# Patient Record
Sex: FEMALE | Race: WHITE | URBAN - METROPOLITAN AREA
[De-identification: names, ages, dates, MRNs, and addresses within clinical notes are randomized per-mention and may not be internally consistent; named-entity substitution may affect disease eponyms.]

---

## 2018-06-14 ENCOUNTER — EMERGENCY (EMERGENCY)
Facility: HOSPITAL | Age: 73
LOS: 1 days | End: 2018-06-14
Payer: MEDICARE

## 2018-06-14 ENCOUNTER — INPATIENT (INPATIENT)
Facility: HOSPITAL | Age: 73
LOS: 6 days | Discharge: HOSPICE | DRG: 86 | End: 2018-06-21
Attending: SURGERY | Admitting: SURGERY
Payer: MEDICARE

## 2018-06-14 VITALS — HEART RATE: 74 BPM | OXYGEN SATURATION: 100 %

## 2018-06-14 DIAGNOSIS — Z95.5 PRESENCE OF CORONARY ANGIOPLASTY IMPLANT AND GRAFT: Chronic | ICD-10-CM

## 2018-06-14 DIAGNOSIS — I62.9 NONTRAUMATIC INTRACRANIAL HEMORRHAGE, UNSPECIFIED: ICD-10-CM

## 2018-06-14 LAB
ABO RH CONFIRMATION: SIGNIFICANT CHANGE UP
ALBUMIN SERPL ELPH-MCNC: 3.7 G/DL — SIGNIFICANT CHANGE UP (ref 3.3–5.2)
ALP SERPL-CCNC: 59 U/L — SIGNIFICANT CHANGE UP (ref 40–120)
ALT FLD-CCNC: 13 U/L — SIGNIFICANT CHANGE UP
ANION GAP SERPL CALC-SCNC: 10 MMOL/L — SIGNIFICANT CHANGE UP (ref 5–17)
ANION GAP SERPL CALC-SCNC: 12 MMOL/L — SIGNIFICANT CHANGE UP (ref 5–17)
ANION GAP SERPL CALC-SCNC: 15 MMOL/L — SIGNIFICANT CHANGE UP (ref 5–17)
APPEARANCE UR: CLEAR — SIGNIFICANT CHANGE UP
APTT BLD: 31.1 SEC — SIGNIFICANT CHANGE UP (ref 27.5–37.4)
AST SERPL-CCNC: 24 U/L — SIGNIFICANT CHANGE UP
BASE EXCESS BLDA CALC-SCNC: 3.5 MMOL/L — HIGH (ref -2–2)
BASOPHILS # BLD AUTO: 0 K/UL — SIGNIFICANT CHANGE UP (ref 0–0.2)
BASOPHILS NFR BLD AUTO: 0.2 % — SIGNIFICANT CHANGE UP (ref 0–2)
BILIRUB SERPL-MCNC: 0.6 MG/DL — SIGNIFICANT CHANGE UP (ref 0.4–2)
BILIRUB UR-MCNC: NEGATIVE — SIGNIFICANT CHANGE UP
BLD GP AB SCN SERPL QL: SIGNIFICANT CHANGE UP
BLOOD GAS COMMENTS ARTERIAL: SIGNIFICANT CHANGE UP
BUN SERPL-MCNC: 18 MG/DL — SIGNIFICANT CHANGE UP (ref 8–20)
BUN SERPL-MCNC: 5 MG/DL — LOW (ref 8–20)
BUN SERPL-MCNC: 7 MG/DL — LOW (ref 8–20)
CALCIUM SERPL-MCNC: 5.3 MG/DL — CRITICAL LOW (ref 8.6–10.2)
CALCIUM SERPL-MCNC: 8.7 MG/DL — SIGNIFICANT CHANGE UP (ref 8.6–10.2)
CALCIUM SERPL-MCNC: 9.1 MG/DL — SIGNIFICANT CHANGE UP (ref 8.6–10.2)
CHLORIDE SERPL-SCNC: 103 MMOL/L — SIGNIFICANT CHANGE UP (ref 98–107)
CHLORIDE SERPL-SCNC: 114 MMOL/L — HIGH (ref 98–107)
CHLORIDE SERPL-SCNC: 96 MMOL/L — LOW (ref 98–107)
CO2 SERPL-SCNC: 16 MMOL/L — LOW (ref 22–29)
CO2 SERPL-SCNC: 23 MMOL/L — SIGNIFICANT CHANGE UP (ref 22–29)
CO2 SERPL-SCNC: 27 MMOL/L — SIGNIFICANT CHANGE UP (ref 22–29)
COLOR SPEC: YELLOW — SIGNIFICANT CHANGE UP
CREAT SERPL-MCNC: 0.39 MG/DL — LOW (ref 0.5–1.3)
CREAT SERPL-MCNC: 0.59 MG/DL — SIGNIFICANT CHANGE UP (ref 0.5–1.3)
CREAT SERPL-MCNC: <0.2 MG/DL — LOW (ref 0.5–1.3)
DIFF PNL FLD: ABNORMAL
EOSINOPHIL # BLD AUTO: 0.1 K/UL — SIGNIFICANT CHANGE UP (ref 0–0.5)
EOSINOPHIL NFR BLD AUTO: 0.5 % — SIGNIFICANT CHANGE UP (ref 0–6)
EPI CELLS # UR: SIGNIFICANT CHANGE UP
GAS PNL BLDA: SIGNIFICANT CHANGE UP
GLUCOSE BLDC GLUCOMTR-MCNC: 123 MG/DL — HIGH (ref 70–99)
GLUCOSE BLDC GLUCOMTR-MCNC: 141 MG/DL — HIGH (ref 70–99)
GLUCOSE BLDC GLUCOMTR-MCNC: 148 MG/DL — HIGH (ref 70–99)
GLUCOSE BLDC GLUCOMTR-MCNC: 153 MG/DL — HIGH (ref 70–99)
GLUCOSE SERPL-MCNC: 135 MG/DL — HIGH (ref 70–115)
GLUCOSE SERPL-MCNC: 150 MG/DL — HIGH (ref 70–115)
GLUCOSE SERPL-MCNC: 94 MG/DL — SIGNIFICANT CHANGE UP (ref 70–115)
GLUCOSE UR QL: NEGATIVE MG/DL — SIGNIFICANT CHANGE UP
HCO3 BLDA-SCNC: 28 MMOL/L — HIGH (ref 20–26)
HCT VFR BLD CALC: 36.7 % — LOW (ref 37–47)
HGB BLD-MCNC: 11.2 G/DL — LOW (ref 12–16)
HOROWITZ INDEX BLDA+IHG-RTO: 40 — SIGNIFICANT CHANGE UP
INR BLD: 1.49 RATIO — HIGH (ref 0.88–1.16)
INR BLD: 1.74 RATIO — HIGH (ref 0.88–1.16)
KETONES UR-MCNC: ABNORMAL
LACTATE BLDV-MCNC: 1.4 MMOL/L — SIGNIFICANT CHANGE UP (ref 0.5–2)
LEUKOCYTE ESTERASE UR-ACNC: NEGATIVE — SIGNIFICANT CHANGE UP
LYMPHOCYTES # BLD AUTO: 1.3 K/UL — SIGNIFICANT CHANGE UP (ref 1–4.8)
LYMPHOCYTES # BLD AUTO: 9.6 % — LOW (ref 20–55)
MAGNESIUM SERPL-MCNC: 1.5 MG/DL — LOW (ref 1.6–2.6)
MCHC RBC-ENTMCNC: 24.3 PG — LOW (ref 27–31)
MCHC RBC-ENTMCNC: 30.5 G/DL — LOW (ref 32–36)
MCV RBC AUTO: 79.8 FL — LOW (ref 81–99)
MONOCYTES # BLD AUTO: 1.2 K/UL — HIGH (ref 0–0.8)
MONOCYTES NFR BLD AUTO: 8.7 % — SIGNIFICANT CHANGE UP (ref 3–10)
NEUTROPHILS # BLD AUTO: 10.7 K/UL — HIGH (ref 1.8–8)
NEUTROPHILS NFR BLD AUTO: 80.6 % — HIGH (ref 37–73)
NITRITE UR-MCNC: NEGATIVE — SIGNIFICANT CHANGE UP
OSMOLALITY SERPL: 303 MOSM/KG — HIGH (ref 280–300)
OSMOLALITY UR: 482 MOSM/KG — SIGNIFICANT CHANGE UP (ref 300–1000)
PCO2 BLDA: 39 MMHG — SIGNIFICANT CHANGE UP (ref 35–45)
PH BLDA: 7.46 — HIGH (ref 7.35–7.45)
PH UR: 8 — SIGNIFICANT CHANGE UP (ref 5–8)
PLATELET # BLD AUTO: 255 K/UL — SIGNIFICANT CHANGE UP (ref 150–400)
PO2 BLDA: 92 MMHG — SIGNIFICANT CHANGE UP (ref 83–108)
POTASSIUM SERPL-MCNC: 2.1 MMOL/L — CRITICAL LOW (ref 3.5–5.3)
POTASSIUM SERPL-MCNC: 2.9 MMOL/L — CRITICAL LOW (ref 3.5–5.3)
POTASSIUM SERPL-MCNC: 3.5 MMOL/L — SIGNIFICANT CHANGE UP (ref 3.5–5.3)
POTASSIUM SERPL-SCNC: 2.1 MMOL/L — CRITICAL LOW (ref 3.5–5.3)
POTASSIUM SERPL-SCNC: 2.9 MMOL/L — CRITICAL LOW (ref 3.5–5.3)
POTASSIUM SERPL-SCNC: 3.5 MMOL/L — SIGNIFICANT CHANGE UP (ref 3.5–5.3)
PROT SERPL-MCNC: 6.8 G/DL — SIGNIFICANT CHANGE UP (ref 6.6–8.7)
PROT UR-MCNC: 15 MG/DL
PROTHROM AB SERPL-ACNC: 16.5 SEC — HIGH (ref 9.8–12.7)
PROTHROM AB SERPL-ACNC: 19.4 SEC — HIGH (ref 9.8–12.7)
RBC # BLD: 4.6 M/UL — SIGNIFICANT CHANGE UP (ref 4.4–5.2)
RBC # FLD: 18.3 % — HIGH (ref 11–15.6)
RBC CASTS # UR COMP ASSIST: ABNORMAL /HPF (ref 0–4)
SAO2 % BLDA: 98 % — SIGNIFICANT CHANGE UP (ref 95–99)
SODIUM SERPL-SCNC: 134 MMOL/L — LOW (ref 135–145)
SODIUM SERPL-SCNC: 140 MMOL/L — SIGNIFICANT CHANGE UP (ref 135–145)
SODIUM SERPL-SCNC: 142 MMOL/L — SIGNIFICANT CHANGE UP (ref 135–145)
SODIUM UR-SCNC: 176 MMOL/L — SIGNIFICANT CHANGE UP
SP GR SPEC: 1.01 — SIGNIFICANT CHANGE UP (ref 1.01–1.02)
TYPE + AB SCN PNL BLD: SIGNIFICANT CHANGE UP
UROBILINOGEN FLD QL: NEGATIVE MG/DL — SIGNIFICANT CHANGE UP
WBC # BLD: 13.3 K/UL — HIGH (ref 4.8–10.8)
WBC # FLD AUTO: 13.3 K/UL — HIGH (ref 4.8–10.8)
WBC UR QL: SIGNIFICANT CHANGE UP

## 2018-06-14 PROCEDURE — 99285 EMERGENCY DEPT VISIT HI MDM: CPT | Mod: 25

## 2018-06-14 PROCEDURE — 70450 CT HEAD/BRAIN W/O DYE: CPT | Mod: 26

## 2018-06-14 PROCEDURE — 31500 INSERT EMERGENCY AIRWAY: CPT

## 2018-06-14 PROCEDURE — 99223 1ST HOSP IP/OBS HIGH 75: CPT

## 2018-06-14 PROCEDURE — 72125 CT NECK SPINE W/O DYE: CPT | Mod: 26

## 2018-06-14 PROCEDURE — 70450 CT HEAD/BRAIN W/O DYE: CPT | Mod: 26,59

## 2018-06-14 PROCEDURE — 72170 X-RAY EXAM OF PELVIS: CPT | Mod: 26

## 2018-06-14 PROCEDURE — 71045 X-RAY EXAM CHEST 1 VIEW: CPT | Mod: 26,77

## 2018-06-14 PROCEDURE — 93010 ELECTROCARDIOGRAM REPORT: CPT

## 2018-06-14 PROCEDURE — 71045 X-RAY EXAM CHEST 1 VIEW: CPT | Mod: 26

## 2018-06-14 PROCEDURE — 70496 CT ANGIOGRAPHY HEAD: CPT | Mod: 26

## 2018-06-14 RX ORDER — MAGNESIUM SULFATE 500 MG/ML
2 VIAL (ML) INJECTION ONCE
Qty: 0 | Refills: 0 | Status: COMPLETED | OUTPATIENT
Start: 2018-06-14 | End: 2018-06-14

## 2018-06-14 RX ORDER — PANTOPRAZOLE SODIUM 20 MG/1
40 TABLET, DELAYED RELEASE ORAL DAILY
Qty: 0 | Refills: 0 | Status: DISCONTINUED | OUTPATIENT
Start: 2018-06-14 | End: 2018-06-20

## 2018-06-14 RX ORDER — PETROLATUM,WHITE
1 JELLY (GRAM) TOPICAL EVERY 6 HOURS
Qty: 0 | Refills: 0 | Status: DISCONTINUED | OUTPATIENT
Start: 2018-06-14 | End: 2018-06-21

## 2018-06-14 RX ORDER — CHLORHEXIDINE GLUCONATE 213 G/1000ML
15 SOLUTION TOPICAL
Qty: 0 | Refills: 0 | Status: DISCONTINUED | OUTPATIENT
Start: 2018-06-14 | End: 2018-06-17

## 2018-06-14 RX ORDER — CHLORHEXIDINE GLUCONATE 213 G/1000ML
15 SOLUTION TOPICAL
Qty: 0 | Refills: 0 | Status: DISCONTINUED | OUTPATIENT
Start: 2018-06-14 | End: 2018-06-14

## 2018-06-14 RX ORDER — NICARDIPINE HYDROCHLORIDE 30 MG/1
5 CAPSULE, EXTENDED RELEASE ORAL
Qty: 40 | Refills: 0 | Status: DISCONTINUED | OUTPATIENT
Start: 2018-06-14 | End: 2018-06-14

## 2018-06-14 RX ORDER — PHYTONADIONE (VIT K1) 5 MG
10 TABLET ORAL ONCE
Qty: 0 | Refills: 0 | Status: COMPLETED | OUTPATIENT
Start: 2018-06-14 | End: 2018-06-14

## 2018-06-14 RX ORDER — POTASSIUM CHLORIDE 20 MEQ
10 PACKET (EA) ORAL
Qty: 0 | Refills: 0 | Status: COMPLETED | OUTPATIENT
Start: 2018-06-14 | End: 2018-06-14

## 2018-06-14 RX ORDER — LEVETIRACETAM 250 MG/1
500 TABLET, FILM COATED ORAL EVERY 12 HOURS
Qty: 0 | Refills: 0 | Status: DISCONTINUED | OUTPATIENT
Start: 2018-06-14 | End: 2018-06-18

## 2018-06-14 RX ORDER — METOPROLOL TARTRATE 50 MG
5 TABLET ORAL EVERY 6 HOURS
Qty: 0 | Refills: 0 | Status: DISCONTINUED | OUTPATIENT
Start: 2018-06-14 | End: 2018-06-16

## 2018-06-14 RX ORDER — FENTANYL CITRATE 50 UG/ML
0.5 INJECTION INTRAVENOUS
Qty: 2500 | Refills: 0 | Status: DISCONTINUED | OUTPATIENT
Start: 2018-06-14 | End: 2018-06-14

## 2018-06-14 RX ORDER — SODIUM CHLORIDE 9 MG/ML
1000 INJECTION INTRAMUSCULAR; INTRAVENOUS; SUBCUTANEOUS
Qty: 0 | Refills: 0 | Status: DISCONTINUED | OUTPATIENT
Start: 2018-06-14 | End: 2018-06-16

## 2018-06-14 RX ORDER — NICARDIPINE HYDROCHLORIDE 30 MG/1
3 CAPSULE, EXTENDED RELEASE ORAL
Qty: 40 | Refills: 0 | Status: DISCONTINUED | OUTPATIENT
Start: 2018-06-14 | End: 2018-06-16

## 2018-06-14 RX ORDER — NICOTINE POLACRILEX 2 MG
1 GUM BUCCAL DAILY
Qty: 0 | Refills: 0 | Status: DISCONTINUED | OUTPATIENT
Start: 2018-06-14 | End: 2018-06-21

## 2018-06-14 RX ADMIN — NICARDIPINE HYDROCHLORIDE 25 MG/HR: 30 CAPSULE, EXTENDED RELEASE ORAL at 11:43

## 2018-06-14 RX ADMIN — Medication 5 MILLIGRAM(S): at 18:11

## 2018-06-14 RX ADMIN — LEVETIRACETAM 420 MILLIGRAM(S): 250 TABLET, FILM COATED ORAL at 14:30

## 2018-06-14 RX ADMIN — Medication 50 GRAM(S): at 20:00

## 2018-06-14 RX ADMIN — FENTANYL CITRATE 4.83 MICROGRAM(S)/KG/HR: 50 INJECTION INTRAVENOUS at 04:38

## 2018-06-14 RX ADMIN — Medication 102 MILLIGRAM(S): at 04:34

## 2018-06-14 RX ADMIN — NICARDIPINE HYDROCHLORIDE 25 MG/HR: 30 CAPSULE, EXTENDED RELEASE ORAL at 04:08

## 2018-06-14 RX ADMIN — PANTOPRAZOLE SODIUM 40 MILLIGRAM(S): 20 TABLET, DELAYED RELEASE ORAL at 11:42

## 2018-06-14 RX ADMIN — Medication 100 MILLIEQUIVALENT(S): at 10:42

## 2018-06-14 RX ADMIN — Medication 100 MILLIEQUIVALENT(S): at 18:44

## 2018-06-14 RX ADMIN — Medication 5 MILLIGRAM(S): at 06:40

## 2018-06-14 RX ADMIN — Medication 1 APPLICATION(S): at 18:13

## 2018-06-14 RX ADMIN — Medication 100 MILLIEQUIVALENT(S): at 23:34

## 2018-06-14 RX ADMIN — Medication 100 MILLIEQUIVALENT(S): at 11:43

## 2018-06-14 RX ADMIN — Medication 50 GRAM(S): at 19:37

## 2018-06-14 RX ADMIN — CHLORHEXIDINE GLUCONATE 15 MILLILITER(S): 213 SOLUTION TOPICAL at 06:36

## 2018-06-14 RX ADMIN — Medication 5 MILLIGRAM(S): at 11:42

## 2018-06-14 RX ADMIN — Medication 100 MILLIEQUIVALENT(S): at 09:48

## 2018-06-14 RX ADMIN — SODIUM CHLORIDE 100 MILLILITER(S): 9 INJECTION INTRAMUSCULAR; INTRAVENOUS; SUBCUTANEOUS at 04:08

## 2018-06-14 RX ADMIN — Medication 100 MILLIEQUIVALENT(S): at 20:30

## 2018-06-14 NOTE — ED ADULT TRIAGE NOTE - CHIEF COMPLAINT QUOTE
Pt transfer from [place] with multiple intracranial hemorrhages, intubated prior to arrival, pt rec'd vit K and 2FFP and is on propofol drip, code trauma A initiated

## 2018-06-14 NOTE — H&P ADULT - HISTORY OF PRESENT ILLNESS
72F on coumadin/ plavix  s/p unwitnessed fall from standing as per . They were home when  heard a noise and within five went to check on his wife. Noted she was on the ground, responsive. Denied LOC and answering husbands questioning. Called ambulance and while waiting patient seemed "out of it" Upon arrival to [place] code trauma activated and CT done. Noted to have ICH. Patient found to have declining gcs and intubated at [facility] to protect airway. INR 2.3 given vitamin k 2FFP and 1platelets  [facility] trauma attending and Nsx contacted for transfer. Upon arrival to [facility] pupils unreactive and gcs 8T. No new findings. SEnt for repeat CT per Nsx

## 2018-06-14 NOTE — H&P ADULT - PMH
Coronary artery disease, angina presence unspecified, unspecified vessel or lesion type, unspecified whether native or transplanted heart    Hypertension, unspecified type    Myocardial infarction, unspecified MI type, unspecified artery

## 2018-06-14 NOTE — CONSULT NOTE ADULT - ASSESSMENT
IMP:  CT head: Interval increase in size of a parenchymal hematoma   centered on the anterior body of the corpus callosum. Interval increase in   extent of intraventricular hemorrhage, with new mild hydrocephalus.   CTA head: Motion degraded examination limiting evaluation for small   aneurysms. No CT evidence of large vessel occlusion or aneurysm is within   the limitations of the exam.     EXAM WITH WITHDRAWAL ALL EXTREM TO PAIN,  SOME LOCALIZING BLUE  NO EYE OPENING  GCS=7    LARGE VOLUME BLOOD, NON SURGICAL

## 2018-06-14 NOTE — ED ADULT NURSE REASSESSMENT NOTE - NS ED NURSE REASSESS COMMENT FT1
see trauma flow sheet. pt transported with RN, trauma team, transport box, monitor, and oxygen. pt transported without incident.

## 2018-06-14 NOTE — ED PROVIDER NOTE - OBJECTIVE STATEMENT
Code Trauma A activated on pt's arrival.  73 yo F presents to ED via EMS [place] [facility] [place] evaluation of intracranial hemorrhage.  Pt with reported unwitnessed fall at home.  Pt on Coumadin and Plavix and  subsequently found to have a large intracranial hemorrhage and was intubated and started on Propofol infusion.  On arrival pt sedated initially not following any verbal commands.  Pt being evaluated by Trauma and Neurosurgery

## 2018-06-14 NOTE — PROGRESS NOTE ADULT - SUBJECTIVE AND OBJECTIVE BOX
INTERVAL HPI/OVERNIGHT EVENTS:  [place] [facility] [place] [facility], on coumadin, reversed     PRESSORS: [ ] YES [x ] NO    ANTIBIOTICS:          x        DATE STARTED:    MEDICATIONS  (STANDING):  levETIRAcetam  IVPB 500 milliGRAM(s) IV Intermittent every 12 hours  metoprolol tartrate Injectable 5 milliGRAM(s) IV Push every 6 hours  niCARdipine Infusion 5 mG/Hr (25 mL/Hr) IV Continuous <Continuous>  pantoprazole  Injectable 40 milliGRAM(s) IV Push daily  petrolatum white Ointment 1 Application(s) Topical every 6 hours  sodium chloride 0.9%. 1000 milliLiter(s) (100 mL/Hr) IV Continuous <Continuous>    MEDICATIONS  (PRN):      Drug Dosing Weight  Height (cm): 175.26 (14 Jun 2018 04:04)  Weight (kg): 96.6 (14 Jun 2018 04:04)  BMI (kg/m2): 31.4 (14 Jun 2018 04:04)  BSA (m2): 2.12 (14 Jun 2018 04:04)    CENTRAL LINE: [ ] YES [ x] NO  LOCATION:   DATE INSERTED:  REMOVE: [ ] YES [ ] NO  EXPLAIN:    CHANEY: [x ] YES [ ] NO    DATE INSERTED:  REMOVE: [ ] YES [ ] NO  EXPLAIN:    A-LINE: x[ ] YES [ ] NO  LOCATION:   DATE INSERTED:  REMOVE: [ ] YES [ ] NO  EXPLAIN:    PAST MEDICAL & SURGICAL HISTORY:  Hypertension, unspecified type  Coronary artery disease, angina presence unspecified, unspecified vessel or lesion type, unspecified whether native or transplanted heart  Myocardial infarction, unspecified MI type, unspecified artery  H/O heart artery stent      ICU Vital Signs Last 24 Hrs  T(C): 36.9 (14 Jun 2018 16:00), Max: 37.3 (14 Jun 2018 08:00)  T(F): 98.4 (14 Jun 2018 16:00), Max: 99.1 (14 Jun 2018 08:00)  HR: 83 (14 Jun 2018 16:00) (72 - 88)  BP: 165/84 (14 Jun 2018 04:35) (165/84 - 197/115)  BP(mean): 110 (14 Jun 2018 04:35) (110 - 147)  ABP: 150/73 (14 Jun 2018 16:00) (135/76 - 170/93)  ABP(mean): 100 (14 Jun 2018 16:00) (96 - 124)  RR: 21 (14 Jun 2018 16:00) (16 - 23)  SpO2: 98% (14 Jun 2018 16:00) (86% - 100%)      ABG - ( 14 Jun 2018 12:06 )  pH, Arterial: 7.46  pH, Blood: x     /  pCO2: 39    /  pO2: 92    / HCO3: 28    / Base Excess: 3.5   /  SaO2: 98                  I&O's Detail    13 Jun 2018 07:01  -  14 Jun 2018 07:00  --------------------------------------------------------  IN:    fentaNYL Infusion.: 19.2 mL    FFP (Fresh Frozen Plasma): 662 mL    niCARdipine Infusion: 75 mL    Platelets - Single Donor: 217 mL    sodium chloride 0.9%.: 400 mL    Solution: 100 mL  Total IN: 1473.2 mL    OUT:    Indwelling Catheter - Urethral: 1400 mL  Total OUT: 1400 mL    Total NET: 73.2 mL      14 Jun 2018 07:01  -  14 Jun 2018 16:45  --------------------------------------------------------  IN:    fentaNYL Infusion.: 9.6 mL    niCARdipine Infusion: 212.5 mL    sodium chloride 0.9%.: 600 mL    Solution: 300 mL    Solution: 100 mL  Total IN: 1222.1 mL    OUT:    Indwelling Catheter - Urethral: 2400 mL  Total OUT: 2400 mL    Total NET: -1177.9 mL          Mode: CPAP with PS  FiO2: 40  PEEP: 5  PS: 5      Physical Exam:    Neurological:  gcs: E4,V1T,M6.    HEENT: PERRLA, EOMI, no drainage or redness    Neck: No bruits; no thyromegaly or nodules,  No JVD    Back: Normal spine flexure, No CVA tenderness, No deformity or limitation of movement    Respiratory: Breath Sounds equal & clear ET tube in good position. minimal vent settings.     Cardiovascular: Regular rate & rhythm, hypertensive on cardene drip.    Gastrointestinal: Soft, non-tender, normal bowel sounds    Extremities: No peripheral edema, No cyanosis, clubbing       LABS:  CBC Full  -  ( 14 Jun 2018 03:32 )  WBC Count : 13.3 K/uL  Hemoglobin : 11.2 g/dL  Hematocrit : 36.7 %  Platelet Count - Automated : 255 K/uL  Mean Cell Volume : 79.8 fl  Mean Cell Hemoglobin : 24.3 pg  Mean Cell Hemoglobin Concentration : 30.5 g/dL  Auto Neutrophil # : 10.7 K/uL  Auto Lymphocyte # : 1.3 K/uL  Auto Monocyte # : 1.2 K/uL  Auto Eosinophil # : 0.1 K/uL  Auto Basophil # : 0.0 K/uL  Auto Neutrophil % : 80.6 %  Auto Lymphocyte % : 9.6 %  Auto Monocyte % : 8.7 %  Auto Eosinophil % : 0.5 %  Auto Basophil % : 0.2 %    06-14    142  |  103  |  18.0  ----------------------------<  150<H>  3.5   |  27.0  |  0.59    Ca    9.1      14 Jun 2018 03:32    TPro  6.8  /  Alb  3.7  /  TBili  0.6  /  DBili  x   /  AST  24  /  ALT  13  /  AlkPhos  59  06-14    PT/INR - ( 14 Jun 2018 08:52 )   PT: 16.5 sec;   INR: 1.49 ratio         PTT - ( 14 Jun 2018 03:32 )  PTT:31.1 sec      Assessment and Plan:    Neuro: NS following. gcs is much improved. neuro checks q 1 hour.    HEENT: no issues    CV: Continue hemodynamic monitoring, keep sbp ,150 on cardene drip to achieve goal.    Pulm: SBT wean to extubate.    GI/Nutrition: npo for now, will need s/s eval.    /Renal: keep chaney for strict i/o monitoring.     HEME-  SCDs    ID: x    Lines/Tubes: x    Endo: x    Skin: x    Dispo: need to discuss with patient and family the goals of care. keep in sicu, for neuro checks q 1 hour.

## 2018-06-14 NOTE — H&P ADULT - NSHPLABSRESULTS_GEN_ALL_CORE
LABS:                        11.2   13.3  )-----------( 255      ( 14 Jun 2018 03:32 )             36.7           PT/INR - ( 14 Jun 2018 03:32 )   PT: 19.4 sec;   INR: 1.74 ratio         PTT - ( 14 Jun 2018 03:32 )  PTT:31.1 sec

## 2018-06-14 NOTE — H&P ADULT - ASSESSMENT
72 F transfer from [facility] due to ICH, hemodynamically well and stable best gcs 8T   -Nsx consult   -admit ICU, neurochecks q1  -rNsx exam  -repeat CT head and CTA   -repeat labs   -possible evd placement per NSx  -TBI foundation protocols for management   -FS q6hr/ NPO 72 F transfer from [facility] due to ICH, hemodynamically well and stable best gcs 8T   -Nsx consult , all recommendations and conversation with family appreciated   -admit ICU, neurochecks q1  -Nsx exam  -repeat CT head and CTA   -repeat labs   -TBI foundation protocols for management   -FS q6hr/ NPO   cardene drip with goal SBP <160

## 2018-06-14 NOTE — H&P ADULT - ATTENDING COMMENTS
72F transfer from [place] with report of fall on coumadin and plavix but altered. ED reports patient was verbal on arrival but somnolent and intubated due to protect airway. Transferred for large intracranial bleed. Given 2 units of FFP for INR of 2.3 and vitamin K.    Upon arrival to [facility], ETT was in place, good end tidal of 45, breath sounds present bilaterally, initial HR 70s, SBP 190s. Secondary exam significant for 2mm pupils, nonreactive, moving all extremities spontaneously, localized to painful stimuli.    Repeat CT head obtained showing increased interventricular hemorrhage and interval increase in sizse of parenchymal hemorrhage    Problems:  ICH  Hypertension  Coagulopathy  CAD    Admit to SICU  Neuro: serial neurologic examinations. NSY consultation  CV: Antihypertensive gtt to control hypertension  RESP: lung protective strategy while mechanically ventilated  GI: NPO for now, PPI  : maintain UOP 0.5 cc/kg/hr  Heme: correct coagulopathy  ID: no issues

## 2018-06-14 NOTE — CONSULT NOTE ADULT - SUBJECTIVE AND OBJECTIVE BOX
SOURCE: [facility]  AND ER CHART FROM [place]  HPI:Patient is a 72y old  Female who presents AS TRANSFER FROM [facility].  STATES HE FOUND PATIENT ON FLOOR, WHE WAS ALERT AND CONVERSANT, STATED SHE WAS GOING FROM BED TO BATHROOM AND FELL. SHE HAD SOME CONFUSION WHEN EMS CAME TO HOME BUT WAS GROSSLY INTACT. PATINET TAKES COUMADIN, PLAVIX AND ASA 81.   DENIES SHE HAD ANY COMLAINTS OF HEAD ACHE DIZZINESS OR BALANCE PROB PRIOR TO FALL.   PATIENT PRESENTS TO [facility] INTUBATED, SEDATED.           PAST MEDICAL:  MI'S IN PAST ( MOST RECENT 1.5 YRS)  GERD  HTN  ? ASTHMA ? COPD         SURGICAL HISTORY:  CARD STENTS X'S 2 1.5 YRS AGO      SOCIAL HISTORY: +  EtOH OCCATIONALLY/SOCIAL, +  tobacco 2PPD X'S 50-60 YRS,  - drugs    FAMILY HISTORY:  DENIES      ROS:  CONSTITUTIONAL: No fever, weight loss, or fatigue  EYES: No eye pain, visual disturbances, or discharge  ENMT:  No difficulty hearing, tinnitus, vertigo; No sinus or throat pain  NECK: No pain or stiffness  BREASTS: No pain, masses, or nipple discharge  RESPIRATORY: No cough, wheezing, chills or hemoptysis; No shortness of breath  CARDIOVASCULAR: No chest pain, palpitations, dizziness, or leg swelling  GASTROINTESTINAL: No abdominal or epigastric pain. No nausea, vomiting, or hematemesis; No diarrhea or constipation. No melena or hematochezia.  GENITOURINARY: No dysuria, frequency, hematuria, or incontinence  NEUROLOGICAL: No headaches, memory loss, loss of strength, numbness, or tremors  SKIN: No itching, burning, rashes, or lesions   LYMPH NODES: No enlarged glands  ENDOCRINE: No heat or cold intolerance; No hair loss  MUSCULOSKELETAL: No joint pain or swelling; No muscle, back, or extremity pain  PSYCHIATRIC: No depression, anxiety, mood swings, or difficulty sleeping  HEME/LYMPH: No easy bruising, or bleeding gums  ALLERY AND IMMUNOLOGIC: No hives or eczema      MEDICATIONS: HOME:  COUMADIN, PLAVIX, ASPRIN 81, LISINOPRIL, METOPROLOL, TRAMADOL, ZOLPIDEM, ALBUTEROL INHALER, NEXIUM, ATORVASTATIN, VIT D  chlorhexidine 0.12% Liquid 15 milliLiter(s) Swish and Spit two times a day  niCARdipine Infusion 5 mG/Hr (25 mL/Hr) IV Continuous <Continuous>  pantoprazole  Injectable 40 milliGRAM(s) IV Push daily  sodium chloride 0.9%. 1000 milliLiter(s) (100 mL/Hr) IV Continuous <Continuous>    MEDICATIONS  (PRN): TYLENOL, ALBUTEROL INHALER      Allergies:  No Known Allergies      Vital Signs Last 24 Hrs  T(C): --  T(F): --  HR: 74 (14 Jun 2018 03:14) (74 - 74)  BP: --  BP(mean): --  RR: --  SpO2: 100% (14 Jun 2018 03:14) (100% - 100%)    PHYSICAL EXAM:  GENERAL: NAD, well-groomed, well-developed  HEAD:  Atraumatic, Normocephalic  EYES: EOMI, PERRLA, conjunctiva and sclera clear  ENMT: No tonsillar erythema, exudates, or enlargement; Moist mucous membranes, Good dentition, No lesions  NECK: Supple, No JVD, Normal thyroid  NERVOUS SYSTEM:  Alert & Oriented X3, Good concentration; Motor Strength 5/5 B/L upper and lower extremities; DTRs 2+ intact and symmetric  CHEST/LUNG: Clear to percussion bilaterally; No rales, rhonchi, wheezing, or rubs  HEART: Regular rate and rhythm; No murmurs, rubs, or gallops  ABDOMEN: Soft, Nontender, Nondistended; Bowel sounds present  EXTREMITIES:  2+ Peripheral Pulses, No clubbing, cyanosis, or edema  LYMPH: No lymphadenopathy noted  SKIN: No rashes or lesions      RADIOLOGY & ADDITIONAL STUDIES:                  RADIOLOGY & ADDITIONAL STUDIES:      IMP:  PLAN: DISCUSSED WITH SOURCE: [facility]OLA  AND ER CHART FROM [place]  HPI:Patient is a 72y old  Female who presents AS TRANSFER FROM [facility].  STATES HE FOUND PATIENT ON FLOOR, WHE WAS ALERT AND CONVERSANT, STATED SHE WAS GOING FROM BED TO BATHROOM AND FELL. SHE HAD SOME CONFUSION WHEN EMS CAME TO HOME BUT WAS GROSSLY INTACT. ABDULAZIZ TAKES COUMADIN, PLAVIX AND ASA 81.   DENIES SHE HAD ANY COMLAINTS OF HEAD ACHE DIZZINESS OR BALANCE PROB PRIOR TO FALL.   PATIENT PRESENTS TO [facility] INTUBATED, SEDATED.           PAST MEDICAL HX:  MI'S IN PAST ( MOST RECENT 1.5 YRS)  GERD  HTN  ? ASTHMA ? COPD  ARTHRITIS R KNEE      SURGICAL HISTORY:  CARD STENTS X'S 2   1.5 YRS AGO      SOCIAL HISTORY: +  EtOH OCCASIONALLY /SOCIAL, +  tobacco 2PPD X'S 50-60 YRS,  - drugs    FAMILY HISTORY:  DENIES      ROS:  UNOBTAINABLE AT THIS TIME,  DENIES KNOWING OF ANY CHRONIC RECURRENT COMPLAINTS    MEDICATIONS: HOME:  COUMADIN, PLAVIX, ASPRIN 81, LISINOPRIL, METOPROLOL, TRAMADOL, ZOLPIDEM, ALBUTEROL INHALER, NEXIUM, ATORVASTATIN, VIT D  chlorhexidine 0.12% Liquid 15 milliLiter(s) Swish and Spit two times a day  niCARdipine Infusion 5 mG/Hr (25 mL/Hr) IV Continuous <Continuous>  pantoprazole  Injectable 40 milliGRAM(s) IV Push daily  sodium chloride 0.9%. 1000 milliLiter(s) (100 mL/Hr) IV Continuous <Continuous>    MEDICATIONS  (PRN): TYLENOL, ALBUTEROL INHALER      Allergies:  No Known Allergies      Vital Signs Last 24 Hrs  T(C): --  T(F): --  HR: 74 (14 Jun 2018 03:14) (74 - 74)  BP: --  BP(mean): --  RR: --  SpO2: 100% (14 Jun 2018 03:14) (100% - 100%)    PHYSICAL EXAM:  GENERAL: NAD, well-groomed, well-developed  HEAD:  HEMATOMA LEFT PARIETAL,  Normocephalic  EYES: PERRLA, conjunctiva and sclera clear  ENMT:INTUBATED  NECK: Supple, No JVD C COLLAR IN PLACE  NERVOUS SYSTEM: INTUBATED, PERRLA SLUGGISH, NO EYE OPENING, WITHDRAWS ALL TO PAINFUL STIM, LOCALIZES BLUE, GCS=7  CHEST/LUNG: Clear  bilaterally; No rales, rhonchi, wheezing, or rubs  BREASTS: + RIGHT BREAST MASS 3X3CM, 12 OCLOCK, SKIN INTACT  HEART: Regular rate  ABDOMEN: Soft, Nontender, Nondistended; Bowel sounds present  EXTREMITIES:  2+ Peripheral Pulses, No clubbing, cyanosis, or edema  LYMPH: No lymphadenopathy noted  SKIN: No rashes or lesions      RADIOLOGY & ADDITIONAL STUDIES:    Noncontrast CT head: Redemonstration of acute parenchymal hemorrhage   centered on the anterior and posterior body of the corpus callosum. The   larger anterior and coronal hematoma measures approximately 4.7 x 4.5 cm in   the axial plane, previously 4.0 x 3.9 cm measured in a similar plane.   Posterior hemorrhagic component measures 2.6 x 1.9 cm, grossly stable in   size since prior exam. There has been marked interval increase in   intraventricular hemorrhage, greatest within the body of the right lateral   ventricle and bilateral temporal horns. Again noted is hemorrhage within the   third and fourth ventricles. Redemonstration of scattered subarachnoid   hemorrhage as well as left parafalcine subdural hematoma, which also layers   along the left tentorial leaflet. Mild dilatation of the bilateral temporal   horns, likely indicative of early hydrocephalus. 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 Mild patchy hypodensities   within the periventricular and subcortical white matter, although   nonspecific, likely reflect chronic microvascular disease.     Right parietal scalp hematoma. No calvarial fracture. Partially imaged   orogastric and endotracheal tubes.     CTA head:     Evaluation of the intracranial vessels is limited secondary to motion.     Visualized portions of the bilateral cervical internal carotid arteries are   patent.     Intracranial atherosclerosis involves the bilateral cavernous and right   supraclinoid segments of the internal carotid arteries, which are otherwise   patent. Conical outpouching of the left supraclinoid internal carotid   artery, most likely represents an infundibular origin of the left posterior   communicating artery. Anterior communicating artery visualized without   evidence of discrete aneurysm. Limited evaluation of the A2 and distal   anterior cerebral artery segments secondary to motion.     Cervical and intracranial vertebral arteries are patent. Basilar and   posterior cerebral arteries are unremarkable.     No evidence of vascular malformation in the region of the hemorrhage.     IMPRESSION:     Noncontrast CT head: Interval increase in size of a parenchymal hematoma   centered on the anterior body of the corpus callosum. Interval increase in   extent of intraventricular hemorrhage, with new mild hydrocephalus.     CTA head: Motion degraded examination limiting evaluation for small   aneurysms. No CT evidence of large vessel occlusion or aneurysm is within   the limitations of the exam.

## 2018-06-14 NOTE — PROVIDER CONTACT NOTE (MEDICATION) - ASSESSMENT
Also, all blood components were released at the same time from the blood bank, therefore must start infusing within 30min from infusion time.

## 2018-06-14 NOTE — PROGRESS NOTE ADULT - ASSESSMENT
A/P: 72y Female tx from [facility] s/p unwitnessed fall on Coumadin/Plavix/ASA, found with anterior corpus callosum IPH and b/l IVH, s/p Vitamin K and FFP administration @ [facility], Vitamin K, 1 unit of platelets and 2 units of FFP upon arrival to [facility]. Repeat CT head this AM stable.  - D/w Dr. Sevilla  - Continue Q1 neuro checks, HOB 30 degrees, BP control- SBP goal < 150  - D/w ACS- likely to extubate today  - Hold anticoagulation/antiplatelet therapy  - SCDs for DVT ppx  - Continue supportive care per SICU

## 2018-06-14 NOTE — PROVIDER CONTACT NOTE (MEDICATION) - SITUATION
Please be aware that plasma and ffp tubing does not go through alaris pump therefore will be infused faster than 1hr as ordered.

## 2018-06-14 NOTE — ED ADULT NURSE NOTE - CHIEF COMPLAINT QUOTE
Pt transfer from [place] with multiple intracranial hemorrhages, intubated prior to arrival, pt rec'd vit K and 2FFP and is on propofol drip, code trauma A initiated

## 2018-06-14 NOTE — H&P ADULT - NSHPPHYSICALEXAM_GEN_ALL_CORE
Vital Signs Last 24 Hrs  T(C): --  T(F): --  HR: 74 (14 Jun 2018 03:14) (74 - 74)  BP: --  BP(mean): --  RR: --  SpO2: 100% (14 Jun 2018 03:14) (100% - 100%)    Physical Exam:  airway- intubated   breath sounds equal bilat and full   2+central and peripheral pulses  pupils equal and non reactive     head AT NC   face AT   Neck: No JVD, no collar removed at [facility]   chest AT   spine no step offs defomrity abrasion   rectal exam tone no blood   Respiratory: Breath Sounds equal & clear to auscultation, no accessory muscle use  Cardiovascular: Regular rate & rhythm, S1, S2  Gastrointestinal: Soft, non-tender  pelvis stable   Extremities: No peripheral edema, No cyanosis atraumatic   Musculoskeletal: No joint pain, swelling, deformity, or point tenderness; no limitation of movement

## 2018-06-14 NOTE — PROGRESS NOTE ADULT - SUBJECTIVE AND OBJECTIVE BOX
INTERVAL HPI/OVERNIGHT EVENTS:  72y Female tx from [facility] s/p unwitnessed fall on Coumadin/Plavix/ASA, found with anterior corpus callosum IPH and b/l IVH, s/p Vitamin K and FFP administration @ [facility], Vitamin K, 1 unit of platelets and 2 units of FFP upon arrival to [facility].     Repeat CT head this AM stable. Patient seen earlier this AM lying in bed, intubated, on low dose sedation- 0.5 mcg/kg/hr of Fentanyl. Opens eyes to noxious, attempts to follow commands    Vital Signs Last 24 Hrs  T(C): 36.9 (14 Jun 2018 12:00), Max: 37.3 (14 Jun 2018 08:00)  T(F): 98.5 (14 Jun 2018 12:00), Max: 99.1 (14 Jun 2018 08:00)  HR: 80 (14 Jun 2018 11:00) (72 - 82)  BP: 165/84 (14 Jun 2018 04:35) (165/84 - 197/115)  BP(mean): 110 (14 Jun 2018 04:35) (110 - 147)  RR: 16 (14 Jun 2018 11:00) (16 - 22)  SpO2: 100% (14 Jun 2018 11:00) (98% - 100%)    PHYSICAL EXAM:  GCS: E- 2 V-1T M- 4-5 = 7-8T  GENERAL: NAD  HEAD:  Atraumatic, normocephalic  NEURO: Intubated. Opens eyes to noxious stimuli; attempts to follow commands- wiggles fingers when asked to show 2 fingers; PERRL; tracks examiner w/ verbal stimuli; difficult to assess facial symmetry w/ ET tube in place; gag reflex intact  MOTOR: + minimal spontaneous movement of R hand, localizes to noxious RUE. Reportedly spontaneously purposeful w/ RUE per RN although not visualized on my assessment. RLE wiggles toes. No motor response LUE/LLE  REFLEXES: LLE upgoing toes    LABS:                        11.2   13.3  )-----------( 255      ( 14 Jun 2018 03:32 )             36.7     06-14    142  |  103  |  18.0  ----------------------------<  150<H>  3.5   |  27.0  |  0.59    Ca    9.1      14 Jun 2018 03:32    TPro  6.8  /  Alb  3.7  /  TBili  0.6  /  DBili  x   /  AST  24  /  ALT  13  /  AlkPhos  59  06-14    PT/INR - ( 14 Jun 2018 08:52 )   PT: 16.5 sec;   INR: 1.49 ratio         PTT - ( 14 Jun 2018 03:32 )  PTT:31.1 sec      06-13 @ 07:01  -  06-14 @ 07:00  --------------------------------------------------------  IN: 1473.2 mL / OUT: 1400 mL / NET: 73.2 mL    06-14 @ 07:01  -  06-14 @ 13:06  --------------------------------------------------------  IN: 322.1 mL / OUT: 1450 mL / NET: -1127.9 mL    RADIOLOGY & ADDITIONAL TESTS:  - CT Head No Cont (06.14.18 @ 09:21)  IMPRESSION:      Stable acute multicompartment intracranial hemorrhage.     - CT Angio Head w/ IV Cont (06.14.18 @ 03:41)  IMPRESSION:   Noncontrast CT head: Interval increase in size of a parenchymal hematoma   centered on the anterior body of the corpus callosum. Interval increase   in extent of intraventricular hemorrhage, with new mild hydrocephalus.   CTA head: Motion degraded examination limiting evaluation for small   aneurysms. No CT evidence of large vessel occlusion or aneurysm is within   the limitations of the exam.

## 2018-06-14 NOTE — H&P ADULT - NSHPSOCIALHISTORY_GEN_ALL_CORE
lives with spouse   denies etoh drug smoking  lives with spouse       +  EtOH OCCASIONALLY /SOCIAL, +  tobacco 2PPD X'S 50-60 YRS,  - drugs

## 2018-06-15 DIAGNOSIS — I62.9 NONTRAUMATIC INTRACRANIAL HEMORRHAGE, UNSPECIFIED: ICD-10-CM

## 2018-06-15 DIAGNOSIS — Z51.5 ENCOUNTER FOR PALLIATIVE CARE: ICD-10-CM

## 2018-06-15 DIAGNOSIS — R53.81 OTHER MALAISE: ICD-10-CM

## 2018-06-15 DIAGNOSIS — R13.19 OTHER DYSPHAGIA: ICD-10-CM

## 2018-06-15 LAB
ANION GAP SERPL CALC-SCNC: 15 MMOL/L — SIGNIFICANT CHANGE UP (ref 5–17)
APTT BLD: 29.7 SEC — SIGNIFICANT CHANGE UP (ref 27.5–37.4)
APTT BLD: 30 SEC — SIGNIFICANT CHANGE UP (ref 27.5–37.4)
BASOPHILS # BLD AUTO: 0 K/UL — SIGNIFICANT CHANGE UP (ref 0–0.2)
BASOPHILS NFR BLD AUTO: 0.1 % — SIGNIFICANT CHANGE UP (ref 0–2)
BUN SERPL-MCNC: 10 MG/DL — SIGNIFICANT CHANGE UP (ref 8–20)
BUN SERPL-MCNC: 9 MG/DL — SIGNIFICANT CHANGE UP (ref 8–20)
BUN SERPL-MCNC: 9 MG/DL — SIGNIFICANT CHANGE UP (ref 8–20)
CALCIUM SERPL-MCNC: 8.4 MG/DL — LOW (ref 8.6–10.2)
CALCIUM SERPL-MCNC: 8.7 MG/DL — SIGNIFICANT CHANGE UP (ref 8.6–10.2)
CALCIUM SERPL-MCNC: 8.7 MG/DL — SIGNIFICANT CHANGE UP (ref 8.6–10.2)
CHLORIDE SERPL-SCNC: 97 MMOL/L — LOW (ref 98–107)
CHLORIDE SERPL-SCNC: 99 MMOL/L — SIGNIFICANT CHANGE UP (ref 98–107)
CHLORIDE SERPL-SCNC: 99 MMOL/L — SIGNIFICANT CHANGE UP (ref 98–107)
CO2 SERPL-SCNC: 21 MMOL/L — LOW (ref 22–29)
CO2 SERPL-SCNC: 22 MMOL/L — SIGNIFICANT CHANGE UP (ref 22–29)
CO2 SERPL-SCNC: 23 MMOL/L — SIGNIFICANT CHANGE UP (ref 22–29)
CREAT SERPL-MCNC: 0.32 MG/DL — LOW (ref 0.5–1.3)
CREAT SERPL-MCNC: 0.35 MG/DL — LOW (ref 0.5–1.3)
CREAT SERPL-MCNC: 0.36 MG/DL — LOW (ref 0.5–1.3)
EOSINOPHIL # BLD AUTO: 0 K/UL — SIGNIFICANT CHANGE UP (ref 0–0.5)
EOSINOPHIL NFR BLD AUTO: 0 % — SIGNIFICANT CHANGE UP (ref 0–6)
GAS PNL BLDA: SIGNIFICANT CHANGE UP
GLUCOSE BLDC GLUCOMTR-MCNC: 140 MG/DL — HIGH (ref 70–99)
GLUCOSE BLDC GLUCOMTR-MCNC: 150 MG/DL — HIGH (ref 70–99)
GLUCOSE BLDC GLUCOMTR-MCNC: 153 MG/DL — HIGH (ref 70–99)
GLUCOSE BLDC GLUCOMTR-MCNC: 157 MG/DL — HIGH (ref 70–99)
GLUCOSE SERPL-MCNC: 145 MG/DL — HIGH (ref 70–115)
GLUCOSE SERPL-MCNC: 155 MG/DL — HIGH (ref 70–115)
GLUCOSE SERPL-MCNC: 174 MG/DL — HIGH (ref 70–115)
HCT VFR BLD CALC: 33.3 % — LOW (ref 37–47)
HGB BLD-MCNC: 10.7 G/DL — LOW (ref 12–16)
INR BLD: 1.21 RATIO — HIGH (ref 0.88–1.16)
INR BLD: 1.23 RATIO — HIGH (ref 0.88–1.16)
LYMPHOCYTES # BLD AUTO: 0.9 K/UL — LOW (ref 1–4.8)
LYMPHOCYTES # BLD AUTO: 6.1 % — LOW (ref 20–55)
MAGNESIUM SERPL-MCNC: 2 MG/DL — SIGNIFICANT CHANGE UP (ref 1.6–2.6)
MAGNESIUM SERPL-MCNC: 2.3 MG/DL — SIGNIFICANT CHANGE UP (ref 1.6–2.6)
MAGNESIUM SERPL-MCNC: 2.7 MG/DL — HIGH (ref 1.6–2.6)
MCHC RBC-ENTMCNC: 24.8 PG — LOW (ref 27–31)
MCHC RBC-ENTMCNC: 32.1 G/DL — SIGNIFICANT CHANGE UP (ref 32–36)
MCV RBC AUTO: 77.1 FL — LOW (ref 81–99)
MONOCYTES # BLD AUTO: 1.5 K/UL — HIGH (ref 0–0.8)
MONOCYTES NFR BLD AUTO: 10.4 % — HIGH (ref 3–10)
NEUTROPHILS # BLD AUTO: 11.7 K/UL — HIGH (ref 1.8–8)
NEUTROPHILS NFR BLD AUTO: 83.1 % — HIGH (ref 37–73)
NT-PROBNP SERPL-SCNC: 3473 PG/ML — HIGH (ref 0–300)
OSMOLALITY SERPL: 293 MOSM/KG — SIGNIFICANT CHANGE UP (ref 280–300)
OSMOLALITY SERPL: 296 MOSM/KG — SIGNIFICANT CHANGE UP (ref 280–300)
PA ADP PRP-ACNC: 216 PRU — SIGNIFICANT CHANGE UP (ref 180–376)
PHOSPHATE SERPL-MCNC: 1.5 MG/DL — LOW (ref 2.4–4.7)
PHOSPHATE SERPL-MCNC: 2.4 MG/DL — SIGNIFICANT CHANGE UP (ref 2.4–4.7)
PHOSPHATE SERPL-MCNC: 2.4 MG/DL — SIGNIFICANT CHANGE UP (ref 2.4–4.7)
PLATELET # BLD AUTO: 222 K/UL — SIGNIFICANT CHANGE UP (ref 150–400)
POTASSIUM SERPL-MCNC: 2.6 MMOL/L — CRITICAL LOW (ref 3.5–5.3)
POTASSIUM SERPL-MCNC: 3.3 MMOL/L — LOW (ref 3.5–5.3)
POTASSIUM SERPL-MCNC: 3.6 MMOL/L — SIGNIFICANT CHANGE UP (ref 3.5–5.3)
POTASSIUM SERPL-SCNC: 2.6 MMOL/L — CRITICAL LOW (ref 3.5–5.3)
POTASSIUM SERPL-SCNC: 3.3 MMOL/L — LOW (ref 3.5–5.3)
POTASSIUM SERPL-SCNC: 3.6 MMOL/L — SIGNIFICANT CHANGE UP (ref 3.5–5.3)
PROTHROM AB SERPL-ACNC: 13.4 SEC — HIGH (ref 9.8–12.7)
PROTHROM AB SERPL-ACNC: 13.6 SEC — HIGH (ref 9.8–12.7)
RBC # BLD: 4.32 M/UL — LOW (ref 4.4–5.2)
RBC # FLD: 17.8 % — HIGH (ref 11–15.6)
SODIUM SERPL-SCNC: 135 MMOL/L — SIGNIFICANT CHANGE UP (ref 135–145)
SODIUM SERPL-SCNC: 135 MMOL/L — SIGNIFICANT CHANGE UP (ref 135–145)
SODIUM SERPL-SCNC: 136 MMOL/L — SIGNIFICANT CHANGE UP (ref 135–145)
T3 SERPL-MCNC: 77 NG/DL — LOW (ref 80–200)
T4 AB SER-ACNC: 6.6 UG/DL — SIGNIFICANT CHANGE UP (ref 4.5–12)
TSH SERPL-MCNC: 0.57 UIU/ML — SIGNIFICANT CHANGE UP (ref 0.27–4.2)
URATE SERPL-MCNC: 3.5 MG/DL — SIGNIFICANT CHANGE UP (ref 2.4–5.7)
WBC # BLD: 14.1 K/UL — HIGH (ref 4.8–10.8)
WBC # FLD AUTO: 14.1 K/UL — HIGH (ref 4.8–10.8)

## 2018-06-15 PROCEDURE — 71045 X-RAY EXAM CHEST 1 VIEW: CPT | Mod: 26

## 2018-06-15 PROCEDURE — 99223 1ST HOSP IP/OBS HIGH 75: CPT

## 2018-06-15 PROCEDURE — 99497 ADVNCD CARE PLAN 30 MIN: CPT | Mod: 25

## 2018-06-15 PROCEDURE — 95819 EEG AWAKE AND ASLEEP: CPT | Mod: 26

## 2018-06-15 RX ORDER — POTASSIUM CHLORIDE 20 MEQ
10 PACKET (EA) ORAL
Qty: 0 | Refills: 0 | Status: COMPLETED | OUTPATIENT
Start: 2018-06-15 | End: 2018-06-15

## 2018-06-15 RX ORDER — MAGNESIUM SULFATE 500 MG/ML
2 VIAL (ML) INJECTION ONCE
Qty: 0 | Refills: 0 | Status: COMPLETED | OUTPATIENT
Start: 2018-06-15 | End: 2018-06-15

## 2018-06-15 RX ORDER — ACETAMINOPHEN 500 MG
1000 TABLET ORAL ONCE
Qty: 0 | Refills: 0 | Status: DISCONTINUED | OUTPATIENT
Start: 2018-06-15 | End: 2018-06-15

## 2018-06-15 RX ORDER — DEXMEDETOMIDINE HYDROCHLORIDE IN 0.9% SODIUM CHLORIDE 4 UG/ML
0.3 INJECTION INTRAVENOUS
Qty: 200 | Refills: 0 | Status: DISCONTINUED | OUTPATIENT
Start: 2018-06-15 | End: 2018-06-15

## 2018-06-15 RX ORDER — POTASSIUM PHOSPHATE, MONOBASIC POTASSIUM PHOSPHATE, DIBASIC 236; 224 MG/ML; MG/ML
15 INJECTION, SOLUTION INTRAVENOUS ONCE
Qty: 0 | Refills: 0 | Status: COMPLETED | OUTPATIENT
Start: 2018-06-15 | End: 2018-06-15

## 2018-06-15 RX ORDER — POTASSIUM CHLORIDE 20 MEQ
40 PACKET (EA) ORAL EVERY 4 HOURS
Qty: 0 | Refills: 0 | Status: COMPLETED | OUTPATIENT
Start: 2018-06-15 | End: 2018-06-16

## 2018-06-15 RX ORDER — POTASSIUM PHOSPHATE, MONOBASIC POTASSIUM PHOSPHATE, DIBASIC 236; 224 MG/ML; MG/ML
30 INJECTION, SOLUTION INTRAVENOUS ONCE
Qty: 0 | Refills: 0 | Status: COMPLETED | OUTPATIENT
Start: 2018-06-15 | End: 2018-06-15

## 2018-06-15 RX ADMIN — POTASSIUM PHOSPHATE, MONOBASIC POTASSIUM PHOSPHATE, DIBASIC 62.5 MILLIMOLE(S): 236; 224 INJECTION, SOLUTION INTRAVENOUS at 02:30

## 2018-06-15 RX ADMIN — Medication 5 MILLIGRAM(S): at 05:28

## 2018-06-15 RX ADMIN — SODIUM CHLORIDE 100 MILLILITER(S): 9 INJECTION INTRAMUSCULAR; INTRAVENOUS; SUBCUTANEOUS at 08:00

## 2018-06-15 RX ADMIN — Medication 1 APPLICATION(S): at 23:51

## 2018-06-15 RX ADMIN — Medication 1 APPLICATION(S): at 00:10

## 2018-06-15 RX ADMIN — NICARDIPINE HYDROCHLORIDE 15 MG/HR: 30 CAPSULE, EXTENDED RELEASE ORAL at 08:00

## 2018-06-15 RX ADMIN — Medication 5 MILLIGRAM(S): at 23:51

## 2018-06-15 RX ADMIN — Medication 100 MILLIEQUIVALENT(S): at 02:30

## 2018-06-15 RX ADMIN — LEVETIRACETAM 420 MILLIGRAM(S): 250 TABLET, FILM COATED ORAL at 02:30

## 2018-06-15 RX ADMIN — Medication 5 MILLIGRAM(S): at 11:16

## 2018-06-15 RX ADMIN — Medication 1 APPLICATION(S): at 05:28

## 2018-06-15 RX ADMIN — Medication 50 GRAM(S): at 23:51

## 2018-06-15 RX ADMIN — CHLORHEXIDINE GLUCONATE 15 MILLILITER(S): 213 SOLUTION TOPICAL at 05:27

## 2018-06-15 RX ADMIN — LEVETIRACETAM 420 MILLIGRAM(S): 250 TABLET, FILM COATED ORAL at 14:57

## 2018-06-15 RX ADMIN — Medication 40 MILLIEQUIVALENT(S): at 23:51

## 2018-06-15 RX ADMIN — Medication 1 APPLICATION(S): at 11:17

## 2018-06-15 RX ADMIN — Medication 100 MILLIEQUIVALENT(S): at 05:27

## 2018-06-15 RX ADMIN — CHLORHEXIDINE GLUCONATE 15 MILLILITER(S): 213 SOLUTION TOPICAL at 17:23

## 2018-06-15 RX ADMIN — Medication 5 MILLIGRAM(S): at 00:07

## 2018-06-15 RX ADMIN — Medication 100 MILLIEQUIVALENT(S): at 03:25

## 2018-06-15 RX ADMIN — Medication 1 PATCH: at 11:17

## 2018-06-15 RX ADMIN — SODIUM CHLORIDE 100 MILLILITER(S): 9 INJECTION INTRAMUSCULAR; INTRAVENOUS; SUBCUTANEOUS at 05:28

## 2018-06-15 RX ADMIN — Medication 5 MILLIGRAM(S): at 17:24

## 2018-06-15 RX ADMIN — Medication 1 APPLICATION(S): at 17:24

## 2018-06-15 RX ADMIN — PANTOPRAZOLE SODIUM 40 MILLIGRAM(S): 20 TABLET, DELAYED RELEASE ORAL at 11:16

## 2018-06-15 NOTE — PROGRESS NOTE ADULT - SUBJECTIVE AND OBJECTIVE BOX
INTERVAL HPI/OVERNIGHT EVENTS:  extubated yesterday. afterwards witnessed seizure , now on keppra.     PRESSORS: [ ] YES [x ] NO  ANTIBIOTICS:             x     DATE STARTED:    MEDICATIONS  (STANDING):  chlorhexidine 0.12% Liquid 15 milliLiter(s) Swish and Spit two times a day  levETIRAcetam  IVPB 500 milliGRAM(s) IV Intermittent every 12 hours  metoprolol tartrate Injectable 5 milliGRAM(s) IV Push every 6 hours  niCARdipine Infusion 3 mG/Hr (15 mL/Hr) IV Continuous <Continuous>  nicotine -  14 mG/24Hr(s) Patch 1 patch Transdermal daily  pantoprazole  Injectable 40 milliGRAM(s) IV Push daily  petrolatum white Ointment 1 Application(s) Topical every 6 hours  sodium chloride 0.9%. 1000 milliLiter(s) (100 mL/Hr) IV Continuous <Continuous>    MEDICATIONS  (PRN):      Drug Dosing Weight  Height (cm): 175.26 (2018 04:04)  Weight (kg): 96.6 (2018 04:04)  BMI (kg/m2): 31.4 (2018 04:04)  BSA (m2): 2.12 (2018 04:04)    CENTRAL LINE: [ ] YES [x ] NO  LOCATION:   DATE INSERTED:  REMOVE: [ ] YES [ ] NO  EXPLAIN:    JOSÉ: [x ] YES [ ] NO    DATE INSERTED:  REMOVE: [x ] YES [ ] NO  EXPLAIN:    A-LINE: [ ] YES [ x] NO  LOCATION:   DATE INSERTED:  REMOVE: [ ] YES [ ] NO  EXPLAIN:    PAST MEDICAL & SURGICAL HISTORY:  Hypertension, unspecified type  Coronary artery disease, angina presence unspecified, unspecified vessel or lesion type, unspecified whether native or transplanted heart  Myocardial infarction, unspecified MI type, unspecified artery  H/O heart artery stent      ICU Vital Signs Last 24 Hrs  T(C): 37.4 (15 Lauri 2018 12:00), Max: 38 (15 Lauri 2018 04:23)  T(F): 99.3 (15 Lauri 2018 12:00), Max: 100.4 (15 Lauri 2018 04:23)  HR: 85 (15 Lauri 2018 13:00) (72 - 109)  BP: 152/75 (2018 23:35) (84/53 - 152/75)  BP(mean): 101 (2018 23:35) (64 - 101)  ABP: 156/82 (15 Lauri 2018 13:00) (116/82 - 160/84)  ABP(mean): 109 (15 Lauri 2018 13:00) (88 - 111)  RR: 22 (15 Lauri 2018 13:00) (19 - 28)  SpO2: 95% (15 Lauri 2018 13:00) (79% - 100%)      ABG - ( 15 Lauri 2018 04:37 )  pH, Arterial: 7.48  pH, Blood: x     /  pCO2: 33    /  pO2: 90    / HCO3: 25    / Base Excess: 0.9   /  SaO2: 98                  I&O's Detail    2018 07:01  -  15 Lauri 2018 07:00  --------------------------------------------------------  IN:    fentaNYL Infusion.: 9.6 mL    niCARdipine Infusion: 327.5 mL    sodium chloride 0.9%.: 2100 mL    Solution: 100 mL    Solution: 200 mL    Solution: 250 mL    Solution: 1000 mL  Total IN: 3987.1 mL    OUT:    Indwelling Catheter - Urethral: 4320 mL  Total OUT: 4320 mL    Total NET: -332.9 mL      15 Lauri 2018 07:01  -  15 Lauri 2018 14:33  --------------------------------------------------------  IN:    niCARdipine Infusion: 50 mL    sodium chloride 0.9%.: 600 mL  Total IN: 650 mL    OUT:    Indwelling Catheter - Urethral: 1525 mL  Total OUT: 1525 mL    Total NET: -875 mL          Mode: standby      Physical Exam:    Neurological:  gcs E4,M5,V1.     HEENT: PERRLA, EOMI, no drainage or redness    Respiratory: Breath Sounds equal tolerating NC , able to cough secretions up.    Cardiovascular: Regular rate & rhythm, on cardene drip     Gastrointestinal: Soft, non-tender, normal bowel sounds    Extremities: No peripheral edema, No cyanosis, clubbing     Vascular: Equal and normal pulses: 2+ peripheral pulses throughout    LABS:  CBC Full  -  ( 15 Lauri 2018 07:08 )  WBC Count : 14.1 K/uL  Hemoglobin : 10.7 g/dL  Hematocrit : 33.3 %  Platelet Count - Automated : 222 K/uL  Mean Cell Volume : 77.1 fl  Mean Cell Hemoglobin : 24.8 pg  Mean Cell Hemoglobin Concentration : 32.1 g/dL  Auto Neutrophil # : 11.7 K/uL  Auto Lymphocyte # : 0.9 K/uL  Auto Monocyte # : 1.5 K/uL  Auto Eosinophil # : 0.0 K/uL  Auto Basophil # : 0.0 K/uL  Auto Neutrophil % : 83.1 %  Auto Lymphocyte % : 6.1 %  Auto Monocyte % : 10.4 %  Auto Eosinophil % : 0.0 %  Auto Basophil % : 0.1 %    06-15    135  |  99  |  10.0  ----------------------------<  145<H>  3.6   |  21.0<L>  |  0.36<L>    Ca    8.4<L>      15 Lauri 2018 07:08  Phos  2.4     -15  Mg     2.3     -15    TPro  6.8  /  Alb  3.7  /  TBili  0.6  /  DBili  x   /  AST  24  /  ALT  13  /  AlkPhos  59  06-14    PT/INR - ( 15 Lauri 2018 07:08 )   PT: 13.6 sec;   INR: 1.23 ratio         PTT - ( 15 Lauri 2018 07:08 )  PTT:30.0 sec  Urinalysis Basic - ( 2018 16:40 )    Color: Yellow / Appearance: Clear / S.010 / pH: x  Gluc: x / Ketone: Trace  / Bili: Negative / Urobili: Negative mg/dL   Blood: x / Protein: 15 mg/dL / Nitrite: Negative   Leuk Esterase: Negative / RBC: 11-25 /HPF / WBC 0-2   Sq Epi: x / Non Sq Epi: Occasional / Bacteria: x        Assessment and Plan:    Neuro: eeg pending, keep keppra, f/u ns, will need pmr / pt/ ot evaluations     CV: Continue hemodynamic monitoring, keep cardene drip until enteral access (duboff) then add oral regimen , goal sbp < 140    Pulm: tolerating NC, pCO2 wnl.    GI/Nutrition: duboff then start enteral feeds.    /Renal: monitor UOP. Monitor BMP.  Replete Lytes as needed      HEME- DVT prophylaxis, SCDs    ID: x    Lines/Tubes: x    Endo: x    Skin: x    Dispo: palliative care consult for goals of care. keep for sicu.

## 2018-06-15 NOTE — CONSULT NOTE ADULT - SUBJECTIVE AND OBJECTIVE BOX
HPI: 71F with PMH as listed admitted  with     PERTINENT PMH REVIEWED: Yes     PAST MEDICAL & SURGICAL HISTORY:  Hypertension, unspecified type  Coronary artery disease, angina presence unspecified, unspecified vessel or lesion type, unspecified whether native or transplanted heart  Myocardial infarction, unspecified MI type, unspecified artery  H/O heart artery stent    SOCIAL HISTORY:  ; smoker                                    Admitted from:  home     Surrogate -  and daughter     FAMILY HISTORY:    Baseline ADLs (prior to admission):  Independent/ Dependent      Allergies    No Known Allergies    Present Symptoms:     Dyspnea: 0 1 2 3   Nausea/Vomiting: Yes No  Anxiety:  Yes No  Depression: Yes No  Fatigue: Yes No  Loss of appetite: Yes No    Pain:             Character-            Duration-            Effect-            Factors-            Frequency-            Location-            Severity-    Review of Systems: Reviewed                     Negative:                     Positive:  Unable to obtain due to poor mentation   All others negative    MEDICATIONS  (STANDING):  chlorhexidine 0.12% Liquid 15 milliLiter(s) Swish and Spit two times a day  levETIRAcetam  IVPB 500 milliGRAM(s) IV Intermittent every 12 hours  metoprolol tartrate Injectable 5 milliGRAM(s) IV Push every 6 hours  niCARdipine Infusion 3 mG/Hr (15 mL/Hr) IV Continuous <Continuous>  nicotine -  14 mG/24Hr(s) Patch 1 patch Transdermal daily  pantoprazole  Injectable 40 milliGRAM(s) IV Push daily  petrolatum white Ointment 1 Application(s) Topical every 6 hours  sodium chloride 0.9%. 1000 milliLiter(s) (100 mL/Hr) IV Continuous <Continuous>    MEDICATIONS  (PRN):    PHYSICAL EXAM:    Vital Signs Last 24 Hrs  T(C): 37.4 (15 Lauri 2018 09:00), Max: 38 (15 Lauri 2018 04:23)  T(F): 99.3 (15 Lauri 2018 09:00), Max: 100.4 (15 Lauri 2018 04:23)  HR: 109 (15 Lauri 2018 11:00) (72 - 109)  BP: 152/75 (2018 23:35) (84/53 - 152/75)  BP(mean): 101 (2018 23:35) (64 - 101)  RR: 25 (15 Lauri 2018 11:00) (18 - 28)  SpO2: 94% (15 Lauri 2018 11:00) (79% - 100%)    General: alert  oriented x ____ lethargic agitated                  cachexia  nonverbal  coma    Karnofsky:  %    HEENT: normal  dry mouth  ET tube/trach    Lungs: comfortable tachypnea/labored breathing  excessive secretions    CV: normal  tachycardia    GI: normal  distended  tender  no BS               PEG/NG/OG tube  constipation  last BM:     : normal  incontinent  oliguria/anuria  chaney    MSK: normal  weakness  edema             ambulatory  bedbound/wheelchair bound    Skin: normal  pressure ulcers- Stage_____  no rash    LABS:                      10.7   14.1  )-----------( 222      ( 15 Lauri 2018 07:08 )             33.3     06-15    135  |  99  |  10.0  ----------------------------<  145<H>  3.6   |  21.0<L>  |  0.36<L>    Ca    8.4<L>      15 Lauri 2018 07:08  Phos  2.4     06-15  Mg     2.3     06-15    TPro  6.8  /  Alb  3.7  /  TBili  0.6  /  DBili  x   /  AST  24  /  ALT  13  /  AlkPhos  59  06-14    PT/INR - ( 15 Lauri 2018 07:08 )   PT: 13.6 sec;   INR: 1.23 ratio       PTT - ( 15 Lauri 2018 07:08 )  PTT:30.0 sec  Urinalysis Basic - ( 2018 16:40 )    Color: Yellow / Appearance: Clear / S.010 / pH: x  Gluc: x / Ketone: Trace  / Bili: Negative / Urobili: Negative mg/dL   Blood: x / Protein: 15 mg/dL / Nitrite: Negative   Leuk Esterase: Negative / RBC: 11-25 /HPF / WBC 0-2   Sq Epi: x / Non Sq Epi: Occasional / Bacteria: x    I&O's Summary    2018 07:  -  15 Lauri 2018 07:00  --------------------------------------------------------  IN: 3987.1 mL / OUT: 4320 mL / NET: -332.9 mL    15 Lauri 2018 07:01  -  15 Lauri 2018 11:16  --------------------------------------------------------  IN: 437.5 mL / OUT: 1125 mL / NET: -687.5 mL    RADIOLOGY & ADDITIONAL STUDIES:    ADVANCE DIRECTIVES: Full Code HPI: 71F with PMH as listed admitted  being found on floor by . She was found to have large corpus callosum hemorrhage and SAH in multiple compartments. Patient did get extubated, and there was some question of seizures, but EEG is negative.     PERTINENT PMH REVIEWED: Yes     PAST MEDICAL & SURGICAL HISTORY:  Hypertension, unspecified type  Coronary artery disease, angina presence unspecified, unspecified vessel or lesion type, unspecified whether native or transplanted heart  Myocardial infarction, unspecified MI type, unspecified artery  H/O heart artery stent    SOCIAL HISTORY:  ; smoker                                    Admitted from:  home     Surrogate -  and daughter     FAMILY HISTORY: non contributory     Baseline ADLs (prior to admission):  mildly dependent      Allergies    No Known Allergies    Present Symptoms:     Dyspnea: 0  Nausea/Vomiting: No  Anxiety:  unable   Depression: unable   Fatigue: unable   Loss of appetite: unable     Pain: none             Character-            Duration-            Effect-            Factors-            Frequency-            Location-            Severity-    Review of Systems: Reviewed                Unable to obtain due to poor mentation   All others negative    MEDICATIONS  (STANDING):  chlorhexidine 0.12% Liquid 15 milliLiter(s) Swish and Spit two times a day  levETIRAcetam  IVPB 500 milliGRAM(s) IV Intermittent every 12 hours  metoprolol tartrate Injectable 5 milliGRAM(s) IV Push every 6 hours  niCARdipine Infusion 3 mG/Hr (15 mL/Hr) IV Continuous <Continuous>  nicotine -  14 mG/24Hr(s) Patch 1 patch Transdermal daily  pantoprazole  Injectable 40 milliGRAM(s) IV Push daily  petrolatum white Ointment 1 Application(s) Topical every 6 hours  sodium chloride 0.9%. 1000 milliLiter(s) (100 mL/Hr) IV Continuous <Continuous>    MEDICATIONS  (PRN):    PHYSICAL EXAM:    Vital Signs Last 24 Hrs  T(C): 37.4 (15 Lauri 2018 09:00), Max: 38 (15 Lauri 2018 04:23)  T(F): 99.3 (15 Lauri 2018 09:00), Max: 100.4 (15 Lauri 2018 04:23)  HR: 109 (15 Lauri 2018 11:00) (72 - 109)  BP: 152/75 (2018 23:35) (84/53 - 152/75)  BP(mean): 101 (2018 23:35) (64 - 101)  RR: 25 (15 Lauri 2018 11:00) (18 - 28)  SpO2: 94% (15 Lauri 2018 11:00) (79% - 100%)    General: lethargic. does open eyes. tracks moves extremities spontaneously.     Karnofsky:  30 %    HEENT: normal      Lungs: comfortable     CV: normal      GI: normal     : chaney    MSK: weakness      Skin: no rash    LABS:                      10.7   14.1  )-----------( 222      ( 15 Lauri 2018 07:08 )             33.3     06-15    135  |  99  |  10.0  ----------------------------<  145<H>  3.6   |  21.0<L>  |  0.36<L>    Ca    8.4<L>      15 Lauri 2018 07:08  Phos  2.4     06-15  Mg     2.3     06-15    TPro  6.8  /  Alb  3.7  /  TBili  0.6  /  DBili  x   /  AST  24  /  ALT  13  /  AlkPhos  59  06-14    PT/INR - ( 15 Lauri 2018 07:08 )   PT: 13.6 sec;   INR: 1.23 ratio       PTT - ( 15 Lauri 2018 07:08 )  PTT:30.0 sec  Urinalysis Basic - ( 2018 16:40 )    Color: Yellow / Appearance: Clear / S.010 / pH: x  Gluc: x / Ketone: Trace  / Bili: Negative / Urobili: Negative mg/dL   Blood: x / Protein: 15 mg/dL / Nitrite: Negative   Leuk Esterase: Negative / RBC: 11-25 /HPF / WBC 0-2   Sq Epi: x / Non Sq Epi: Occasional / Bacteria: x    I&O's Summary    2018 07:  -  15 Lauri 2018 07:00  --------------------------------------------------------  IN: 3987.1 mL / OUT: 4320 mL / NET: -332.9 mL    15 Lauri 2018 07:01  -  15 Lauri 2018 11:16  --------------------------------------------------------  IN: 437.5 mL / OUT: 1125 mL / NET: -687.5 mL    RADIOLOGY & ADDITIONAL STUDIES:    EEG - impression: This is an abnormal record. There is significant movement artifact throughout the recording.  There was global slowing suggestive of diffuse cerebral dysfunction, but no seizures.    < from: CT Head No Cont (18 @ 09:21) >  Large corpus callosum hemorrhage extending bilaterally across the midline measures 4.3 x 5.3 x 3.0 cm. Dissection of hemorrhage in the lateral ventricles is re-demonstrated. Mild hydrocephalus. Thin subdural hemorrhage along the falx. Scattered subarachnoid hemorrhage in the left parietal lobe, bilateral frontal lobes, right temporal lobe, and basal cisterns. Thin subdural hemorrhage along the left tentorial leaflet. Intraventricular hemorrhage is also demonstrated in the fourth ventricle, third ventricle, and cerebral aqueduct.   There is periventricular and subcortical white matter hypodensity without mass effect, nonspecific, likely representing mild chronic microvascular ischemic changes. There is no compelling evidence for an acute   transcortical infarction.  The orbits, mastoid air cells and visualized paranasal sinuses are unremarkable. The calvarium is intact. The patient is intubated. Oral gastric tube is partially visualized.    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    Stable acute multicompartment intracranial hemorrhage.     < end of copied text >    < from: CT Angio Head w/ IV Cont (18 @ 03:41) >    Noncontrast CT head: Interval increase in size of a parenchymal hematoma centered on the anterior body of the corpus callosum. Interval increase in extent of intraventricular hemorrhage, with new mild hydrocephalus.     CTA head: Motion degraded examination limiting evaluation for small aneurysms. No CT evidence of large vessel occlusion or aneurysm is within the limitations of the exam.    < from: Xray Chest 1 View- PORTABLE-Urgent (18 @ 03:31) >  Lower lobe ill-defined opacity/infiltrates. No gross fracture. ET tube tip above tracheal bifurcation. NG tube tip beyond GE junction.    < end of copied text >    ADVANCE DIRECTIVES: Full Code

## 2018-06-15 NOTE — CHART NOTE - NSCHARTNOTEFT_GEN_A_CORE
Pt noted to have Na of 134. Serum osm slightly hi at 303, Uosm hi at 482, Geetha hi at 176.  Increased U/O noted.  Volume assessment reveals NL skin turgur, moist mucus membranes, CTA lung, no peripheral edema.  HGB WNL, BUN Cr WNL and IVC on POCUS non-dynamic.  All suggesting euvolemia.  Given this volume status I do not think cerebral sal wasting.  Serum osm slightly too hi for SIADH.    K of 2.9 begun to be replaced but stopped when add on Mag = 1.5.  Mag replaced and K restarted.  Repeat BMP shows slight improvement of Na, some improvement of K but still low and now isotonic Serum Osm.    Will continue current IVF and electrolyte repletion and monitoring. Na should slightly increase with appropriate replacement of K    GCS stable at ~ 8.  ABG good.  Able to spontaneously cough up secretions.    No immediate need for intubation.    Will further discuss Goals of care with family.  Resp monitoring and toilet

## 2018-06-15 NOTE — PROGRESS NOTE ADULT - SUBJECTIVE AND OBJECTIVE BOX
INTERVAL HPI/OVERNIGHT EVENTS:  72yf s/p hx of fall on coumadin, plavix and asa.   CT IVH, Corpus Callosum      MEDICATIONS  (STANDING):  chlorhexidine 0.12% Liquid 15 milliLiter(s) Swish and Spit two times a day  levETIRAcetam  IVPB 500 milliGRAM(s) IV Intermittent every 12 hours  metoprolol tartrate Injectable 5 milliGRAM(s) IV Push every 6 hours  niCARdipine Infusion 3 mG/Hr (15 mL/Hr) IV Continuous <Continuous>  nicotine -  14 mG/24Hr(s) Patch 1 patch Transdermal daily  pantoprazole  Injectable 40 milliGRAM(s) IV Push daily  petrolatum white Ointment 1 Application(s) Topical every 6 hours  sodium chloride 0.9%. 1000 milliLiter(s) (100 mL/Hr) IV Continuous <Continuous>    MEDICATIONS  (PRN):    Allergies  No Known Allergies  Intolerances      Vital Signs Last 24 Hrs  T(C): 38 (15 Lauri 2018 04:23), Max: 38 (15 Lauri 2018 04:23)  T(F): 100.4 (15 Lauri 2018 04:23), Max: 100.4 (15 Lauri 2018 04:23)  HR: 91 (15 Lauri 2018 07:00) (72 - 108)  BP: 152/75 (2018 23:35) (84/53 - 152/75)  BP(mean): 101 (2018 23:35) (64 - 101)  RR: 24 (15 Lauri 2018 07:00) (16 - 28)  SpO2: 97% (15 Lauri 2018 07:00) (79% - 100%)    PHYSICAL EXAM:  Eyes closed bilat, No eye opening with stimuli yet, after multi attempt to have the eye opened spont without tracking bilat. No Following commands.   GENERAL: NAD, well-groomed, well-developed  HEAD:  Atraumatic, Normocephalic  EYES: EOMI, PERRLA, conjunctiva and sclera clear, dysconjugate gaze   ENMT: No facial asymm  NECK: Supple, No JVD,  NERVOUS SYSTEM:  lethargic, not following commands, Motor Strength withdraws upper and lower extremities;   CHEST/LUNG: Clear BS bilaterally; No rales, rhonchi, wheezing, or rubs  HEART: Regular rate and rhythm; No murmurs, rubs, or gallops  ABDOMEN: Soft, Nontender, Nondistended; Bowel sounds present  EXTREMITIES: No clubbing, cyanosis, or edema    LABS:                        10.7   14.1  )-----------( 222      ( 15 Lauri 2018 07:08 )             33.3     06-15    135  |  99  |  10.0  ----------------------------<  145<H>  3.6   |  21.0<L>  |  0.36<L>    Ca    8.4<L>      15 Lauri 2018 07:08  Phos  2.4     -15  Mg     2.3     -15    TPro  6.8  /  Alb  3.7  /  TBili  0.6  /  DBili  x   /  AST  24  /  ALT  13  /  AlkPhos  59  -14    PT/INR - ( 15 Lauri 2018 07:08 )   PT: 13.6 sec;   INR: 1.23 ratio         PTT - ( 15 Lauri 2018 07:08 )  PTT:30.0 sec  Urinalysis Basic - ( 2018 16:40 )    Color: Yellow / Appearance: Clear / S.010 / pH: x  Gluc: x / Ketone: Trace  / Bili: Negative / Urobili: Negative mg/dL   Blood: x / Protein: 15 mg/dL / Nitrite: Negative   Leuk Esterase: Negative / RBC: 11-25 /HPF / WBC 0-2   Sq Epi: x / Non Sq Epi: Occasional / Bacteria: x      RADIOLOGY & ADDITIONAL TESTS:  < from: CT Head No Cont (18 @ 09:21) >   EXAM:  CT BRAIN                        PROCEDURE DATE:  2018    Large corpus callosum hemorrhage extending bilaterally across the midline   measures 4.3 x 5.3 x 3.0 cm. Dissection of hemorrhage in the lateral   ventricles is redemonstrated. Mild hydrocephalus. Thin subdural   hemorrhage along the falx. Scattered subarachnoid hemorrhage in the left   parietal lobe, bilateral frontal lobes, right temporal lobe, and basal   cisterns. Thin subdural hemorrhage along the left tentorial leaflet.   Intraventricular hemorrhage is also demonstrated in the fourth ventricle,   third ventricle, and cerebral aqueduct.     There is periventricular and subcortical white matter hypodensity without   mass effect, nonspecific, likely representing mild chronic microvascular   ischemic changes. There is no compelling evidence for an acute   transcortical infarction.  The orbits, mastoid air cells and visualized   paranasal sinuses are unremarkable. The calvarium is intact. The patient   is intubated. Oral gastric tube is partially visualized.    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    IMPRESSION:      Stable acute multicompartment intracranial hemorrhage.   < end of copied text > INTERVAL HPI/OVERNIGHT EVENTS:  72yf s/p hx of fall on coumadin, plavix and asa.   CT IVH, Corpus Callosum      MEDICATIONS  (STANDING):  chlorhexidine 0.12% Liquid 15 milliLiter(s) Swish and Spit two times a day  levETIRAcetam  IVPB 500 milliGRAM(s) IV Intermittent every 12 hours  metoprolol tartrate Injectable 5 milliGRAM(s) IV Push every 6 hours  niCARdipine Infusion 3 mG/Hr (15 mL/Hr) IV Continuous <Continuous>  nicotine -  14 mG/24Hr(s) Patch 1 patch Transdermal daily  pantoprazole  Injectable 40 milliGRAM(s) IV Push daily  petrolatum white Ointment 1 Application(s) Topical every 6 hours  sodium chloride 0.9%. 1000 milliLiter(s) (100 mL/Hr) IV Continuous <Continuous>    MEDICATIONS  (PRN):    Allergies  No Known Allergies  Intolerances      Vital Signs Last 24 Hrs  T(C): 38 (15 Lauri 2018 04:23), Max: 38 (15 Lauri 2018 04:23)  T(F): 100.4 (15 Lauri 2018 04:23), Max: 100.4 (15 Lauri 2018 04:23)  HR: 91 (15 Lauri 2018 07:00) (72 - 108)  BP: 152/75 (2018 23:35) (84/53 - 152/75)  BP(mean): 101 (2018 23:35) (64 - 101)  RR: 24 (15 Lauri 2018 07:00) (16 - 28)  SpO2: 97% (15 Lauri 2018 07:00) (79% - 100%)    PHYSICAL EXAM:  Eyes closed bilat, No eye opening with stimuli yet, after multi attempt to have the eye opened spont without tracking bilat. Not Following commands.   GENERAL: NAD, well-groomed, well-developed  HEAD:  Atraumatic, Normocephalic  EYES: EOMI, PERRLA, conjunctiva and sclera clear, dysconjugate gaze   ENMT: No facial asymm  NECK: Supple, No JVD,  NERVOUS SYSTEM:  lethargic, not following commands, Motor Strength withdraws and spontaneously moves upper and lower extremities;   CHEST/LUNG: Clear BS bilaterally; No rales, rhonchi, wheezing, or rubs  HEART: Regular rate and rhythm; No murmurs, rubs, or gallops  ABDOMEN: Soft, Nontender, Nondistended; Bowel sounds present  EXTREMITIES: No clubbing, cyanosis, or edema    LABS:                        10.7   14.1  )-----------( 222      ( 15 Lauri 2018 07:08 )             33.3     06-15    135  |  99  |  10.0  ----------------------------<  145<H>  3.6   |  21.0<L>  |  0.36<L>    Ca    8.4<L>      15 Lauri 2018 07:08  Phos  2.4     -15  Mg     2.3     -15    TPro  6.8  /  Alb  3.7  /  TBili  0.6  /  DBili  x   /  AST  24  /  ALT  13  /  AlkPhos  59  -14    PT/INR - ( 15 Lauri 2018 07:08 )   PT: 13.6 sec;   INR: 1.23 ratio         PTT - ( 15 Lauri 2018 07:08 )  PTT:30.0 sec  Urinalysis Basic - ( 2018 16:40 )    Color: Yellow / Appearance: Clear / S.010 / pH: x  Gluc: x / Ketone: Trace  / Bili: Negative / Urobili: Negative mg/dL   Blood: x / Protein: 15 mg/dL / Nitrite: Negative   Leuk Esterase: Negative / RBC: 11-25 /HPF / WBC 0-2   Sq Epi: x / Non Sq Epi: Occasional / Bacteria: x      RADIOLOGY & ADDITIONAL TESTS:  < from: CT Head No Cont (18 @ 09:21) >   EXAM:  CT BRAIN                        PROCEDURE DATE:  2018    Large corpus callosum hemorrhage extending bilaterally across the midline   measures 4.3 x 5.3 x 3.0 cm. Dissection of hemorrhage in the lateral   ventricles is redemonstrated. Mild hydrocephalus. Thin subdural   hemorrhage along the falx. Scattered subarachnoid hemorrhage in the left   parietal lobe, bilateral frontal lobes, right temporal lobe, and basal   cisterns. Thin subdural hemorrhage along the left tentorial leaflet.   Intraventricular hemorrhage is also demonstrated in the fourth ventricle,   third ventricle, and cerebral aqueduct.     There is periventricular and subcortical white matter hypodensity without   mass effect, nonspecific, likely representing mild chronic microvascular   ischemic changes. There is no compelling evidence for an acute   transcortical infarction.  The orbits, mastoid air cells and visualized   paranasal sinuses are unremarkable. The calvarium is intact. The patient   is intubated. Oral gastric tube is partially visualized.    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    IMPRESSION:      Stable acute multicompartment intracranial hemorrhage.   < end of copied text >

## 2018-06-15 NOTE — CONSULT NOTE ADULT - PROBLEM SELECTOR RECOMMENDATION 9
- imaging is pretty impressive. clinical exam, patient did get extubated and does track and open eyes, need to give this time and see how she does.

## 2018-06-15 NOTE — CONSULT NOTE ADULT - PROBLEM SELECTOR RECOMMENDATION 3
-full assist. Patient prior was able to do most things on her own, but  was helping her with a lot of those ADLs. They had noticed she had a bit of a decline in terms of functional status, and nutritional capacities for the last few months.

## 2018-06-15 NOTE — CONSULT NOTE ADULT - CONSULT REASON
large hemorrhagic stroke
CT head: Interval increase in size of a parenchymal hematoma   centered on the anterior body of the corpus callosum. Interval increase in   extent of intraventricular hemorrhage, with new mild hydrocephalus.

## 2018-06-15 NOTE — CONSULT NOTE ADULT - ATTENDING COMMENTS
PLAN:  DISCUSSED WITH DR ALEGRIA  ICU  Q1H NEURO CKS  HOB UP  SBP<150  CORRECT INR   PLATELETS ( PLAVIX AND ASPRIN HX)  CT HEAD AM 6/14  NO NEUROSURGICAL INTERVENTION  CARE AS PER ACS, MED, NEUROLOGY
COUNSELING:  Face to face meeting to discuss Advanced Care Planning - Time Spent 20 Minutes.        Thank you for the opportunity to assist with the care of this patient.   [facility] [phone].

## 2018-06-15 NOTE — CONSULT NOTE ADULT - PROBLEM SELECTOR RECOMMENDATION 4
-lengthy ACP discussion with  and daughter at bedside. ( there is also a son in [place] but he is not very involved with the family). Explained to them extent of the injury she has had but that we have seen some good signs ( getting off the breathing machines). But I did explain that this can go in either direction, she can start making some positive strides in terms of being more interactive, maybe swallowing, etc, or she can decompensate and begin to get worse - increasing breathing issues due to secretions management, difficulty swallowing, worsening mental status, bleeding getting worse, etc. I did explain to them that her chances for meaningful functional and neurological recovery should she decompensate, is much smaller. Per family, she was adamant and clear that she would not want to be a burden on others, and she would not view being on machines or in a nursing home dependent on others as a quality of life. I explained to them that if that's the case, then we should continue all we are doing to give her every chance to improve including feeding her, and waiting to see how her mental state recovers, but should she get worse we should not intubate her or perform CPR given her known wishes. They are very appropriately emotional and would like some time to think about this. For now full code and continue all measures.

## 2018-06-15 NOTE — PROGRESS NOTE ADULT - ASSESSMENT
70yF Hx of cardiac stents on coumadin, plavix and asa.   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    Plan:  1. Continue supportive care.  2 Blood pressure control

## 2018-06-15 NOTE — CONSULT NOTE ADULT - PROBLEM SELECTOR RECOMMENDATION 2
- spoke to family about short term NG tube trial to feed her while we wait and see how she does, they are in agreement. Explained to them risks, also my concerns that she may get agitated and pull at it ( she was pulling at ET tube yesterda, and today pulling at nasal cannula). They understand the risks. Daughter is concerned about her mom's comfort with the NG tube, but I explained its only temporary to see how things go.

## 2018-06-15 NOTE — CONSULT NOTE ADULT - ASSESSMENT
72F with CAD/stents was on AC, here s/p fall, found to have large corpus callosum hemorrhage, and multi compartment ICH, with poor mental status, dysphagia, debility.

## 2018-06-16 DIAGNOSIS — I10 ESSENTIAL (PRIMARY) HYPERTENSION: ICD-10-CM

## 2018-06-16 DIAGNOSIS — I48.91 UNSPECIFIED ATRIAL FIBRILLATION: ICD-10-CM

## 2018-06-16 LAB
ANION GAP SERPL CALC-SCNC: 15 MMOL/L — SIGNIFICANT CHANGE UP (ref 5–17)
ANION GAP SERPL CALC-SCNC: 15 MMOL/L — SIGNIFICANT CHANGE UP (ref 5–17)
APPEARANCE UR: CLEAR — SIGNIFICANT CHANGE UP
BASOPHILS # BLD AUTO: 0 K/UL — SIGNIFICANT CHANGE UP (ref 0–0.2)
BASOPHILS NFR BLD AUTO: 0.1 % — SIGNIFICANT CHANGE UP (ref 0–2)
BILIRUB UR-MCNC: NEGATIVE — SIGNIFICANT CHANGE UP
BUN SERPL-MCNC: 12 MG/DL — SIGNIFICANT CHANGE UP (ref 8–20)
BUN SERPL-MCNC: 16 MG/DL — SIGNIFICANT CHANGE UP (ref 8–20)
CALCIUM SERPL-MCNC: 8.6 MG/DL — SIGNIFICANT CHANGE UP (ref 8.6–10.2)
CALCIUM SERPL-MCNC: 9.1 MG/DL — SIGNIFICANT CHANGE UP (ref 8.6–10.2)
CHLORIDE SERPL-SCNC: 100 MMOL/L — SIGNIFICANT CHANGE UP (ref 98–107)
CHLORIDE SERPL-SCNC: 98 MMOL/L — SIGNIFICANT CHANGE UP (ref 98–107)
CO2 SERPL-SCNC: 22 MMOL/L — SIGNIFICANT CHANGE UP (ref 22–29)
CO2 SERPL-SCNC: 25 MMOL/L — SIGNIFICANT CHANGE UP (ref 22–29)
COLOR SPEC: YELLOW — SIGNIFICANT CHANGE UP
CREAT SERPL-MCNC: 0.33 MG/DL — LOW (ref 0.5–1.3)
CREAT SERPL-MCNC: 0.38 MG/DL — LOW (ref 0.5–1.3)
DIFF PNL FLD: ABNORMAL
EOSINOPHIL # BLD AUTO: 0 K/UL — SIGNIFICANT CHANGE UP (ref 0–0.5)
EOSINOPHIL NFR BLD AUTO: 0.3 % — SIGNIFICANT CHANGE UP (ref 0–5)
EPI CELLS # UR: SIGNIFICANT CHANGE UP
GLUCOSE BLDC GLUCOMTR-MCNC: 201 MG/DL — HIGH (ref 70–99)
GLUCOSE BLDC GLUCOMTR-MCNC: 217 MG/DL — HIGH (ref 70–99)
GLUCOSE SERPL-MCNC: 184 MG/DL — HIGH (ref 70–115)
GLUCOSE SERPL-MCNC: 198 MG/DL — HIGH (ref 70–115)
GLUCOSE UR QL: 250 MG/DL
HCT VFR BLD CALC: 38.2 % — SIGNIFICANT CHANGE UP (ref 37–47)
HGB BLD-MCNC: 12.3 G/DL — SIGNIFICANT CHANGE UP (ref 12–16)
KETONES UR-MCNC: NEGATIVE — SIGNIFICANT CHANGE UP
LEUKOCYTE ESTERASE UR-ACNC: NEGATIVE — SIGNIFICANT CHANGE UP
LYMPHOCYTES # BLD AUTO: 1.3 K/UL — SIGNIFICANT CHANGE UP (ref 1–4.8)
LYMPHOCYTES # BLD AUTO: 8.4 % — LOW (ref 20–55)
MAGNESIUM SERPL-MCNC: 2.3 MG/DL — SIGNIFICANT CHANGE UP (ref 1.6–2.6)
MCHC RBC-ENTMCNC: 24.6 PG — LOW (ref 27–31)
MCHC RBC-ENTMCNC: 32.2 G/DL — SIGNIFICANT CHANGE UP (ref 32–36)
MCV RBC AUTO: 76.6 FL — LOW (ref 81–99)
MONOCYTES # BLD AUTO: 2.4 K/UL — HIGH (ref 0–0.8)
MONOCYTES NFR BLD AUTO: 15.3 % — HIGH (ref 3–10)
NEUTROPHILS # BLD AUTO: 11.8 K/UL — HIGH (ref 1.8–8)
NEUTROPHILS NFR BLD AUTO: 75.7 % — HIGH (ref 37–73)
NITRITE UR-MCNC: NEGATIVE — SIGNIFICANT CHANGE UP
OSMOLALITY SERPL: 305 MOSM/KG — HIGH (ref 280–300)
OSMOLALITY UR: 549 MOSM/KG — SIGNIFICANT CHANGE UP (ref 300–1000)
PH UR: 7 — SIGNIFICANT CHANGE UP (ref 5–8)
PHOSPHATE SERPL-MCNC: 2 MG/DL — LOW (ref 2.4–4.7)
PLAT MORPH BLD: NORMAL — SIGNIFICANT CHANGE UP
PLATELET # BLD AUTO: 239 K/UL — SIGNIFICANT CHANGE UP (ref 150–400)
POTASSIUM SERPL-MCNC: 3.4 MMOL/L — LOW (ref 3.5–5.3)
POTASSIUM SERPL-MCNC: 3.9 MMOL/L — SIGNIFICANT CHANGE UP (ref 3.5–5.3)
POTASSIUM SERPL-SCNC: 3.4 MMOL/L — LOW (ref 3.5–5.3)
POTASSIUM SERPL-SCNC: 3.9 MMOL/L — SIGNIFICANT CHANGE UP (ref 3.5–5.3)
PROT UR-MCNC: 100 MG/DL
RBC # BLD: 4.99 M/UL — SIGNIFICANT CHANGE UP (ref 4.4–5.2)
RBC # FLD: 17.8 % — HIGH (ref 11–15.6)
RBC BLD AUTO: NORMAL — SIGNIFICANT CHANGE UP
RBC CASTS # UR COMP ASSIST: ABNORMAL /HPF (ref 0–4)
SODIUM SERPL-SCNC: 137 MMOL/L — SIGNIFICANT CHANGE UP (ref 135–145)
SODIUM SERPL-SCNC: 138 MMOL/L — SIGNIFICANT CHANGE UP (ref 135–145)
SP GR SPEC: 1.01 — SIGNIFICANT CHANGE UP (ref 1.01–1.02)
UROBILINOGEN FLD QL: 1 MG/DL
WBC # BLD: 15.6 K/UL — HIGH (ref 4.8–10.8)
WBC # FLD AUTO: 15.6 K/UL — HIGH (ref 4.8–10.8)
WBC UR QL: SIGNIFICANT CHANGE UP

## 2018-06-16 PROCEDURE — 99291 CRITICAL CARE FIRST HOUR: CPT

## 2018-06-16 RX ORDER — METOPROLOL TARTRATE 50 MG
5 TABLET ORAL ONCE
Qty: 0 | Refills: 0 | Status: COMPLETED | OUTPATIENT
Start: 2018-06-16 | End: 2018-06-16

## 2018-06-16 RX ORDER — METOPROLOL TARTRATE 50 MG
50 TABLET ORAL
Qty: 0 | Refills: 0 | Status: DISCONTINUED | OUTPATIENT
Start: 2018-06-16 | End: 2018-06-17

## 2018-06-16 RX ORDER — ATORVASTATIN CALCIUM 80 MG/1
40 TABLET, FILM COATED ORAL AT BEDTIME
Qty: 0 | Refills: 0 | Status: DISCONTINUED | OUTPATIENT
Start: 2018-06-16 | End: 2018-06-20

## 2018-06-16 RX ORDER — LISINOPRIL 2.5 MG/1
10 TABLET ORAL DAILY
Qty: 0 | Refills: 0 | Status: DISCONTINUED | OUTPATIENT
Start: 2018-06-16 | End: 2018-06-20

## 2018-06-16 RX ORDER — POTASSIUM PHOSPHATE, MONOBASIC POTASSIUM PHOSPHATE, DIBASIC 236; 224 MG/ML; MG/ML
15 INJECTION, SOLUTION INTRAVENOUS ONCE
Qty: 0 | Refills: 0 | Status: COMPLETED | OUTPATIENT
Start: 2018-06-16 | End: 2018-06-16

## 2018-06-16 RX ORDER — ACETAMINOPHEN 500 MG
1000 TABLET ORAL ONCE
Qty: 0 | Refills: 0 | Status: COMPLETED | OUTPATIENT
Start: 2018-06-16 | End: 2018-06-16

## 2018-06-16 RX ORDER — ALBUTEROL 90 UG/1
2.5 AEROSOL, METERED ORAL EVERY 6 HOURS
Qty: 0 | Refills: 0 | Status: DISCONTINUED | OUTPATIENT
Start: 2018-06-16 | End: 2018-06-17

## 2018-06-16 RX ORDER — METOPROLOL TARTRATE 50 MG
25 TABLET ORAL
Qty: 0 | Refills: 0 | Status: DISCONTINUED | OUTPATIENT
Start: 2018-06-16 | End: 2018-06-16

## 2018-06-16 RX ORDER — POTASSIUM CHLORIDE 20 MEQ
40 PACKET (EA) ORAL EVERY 4 HOURS
Qty: 0 | Refills: 0 | Status: COMPLETED | OUTPATIENT
Start: 2018-06-16 | End: 2018-06-16

## 2018-06-16 RX ORDER — INSULIN LISPRO 100/ML
VIAL (ML) SUBCUTANEOUS EVERY 6 HOURS
Qty: 0 | Refills: 0 | Status: DISCONTINUED | OUTPATIENT
Start: 2018-06-16 | End: 2018-06-20

## 2018-06-16 RX ADMIN — POTASSIUM PHOSPHATE, MONOBASIC POTASSIUM PHOSPHATE, DIBASIC 83.33 MILLIMOLE(S): 236; 224 INJECTION, SOLUTION INTRAVENOUS at 00:37

## 2018-06-16 RX ADMIN — Medication 1 PATCH: at 11:07

## 2018-06-16 RX ADMIN — Medication 1 APPLICATION(S): at 17:13

## 2018-06-16 RX ADMIN — ATORVASTATIN CALCIUM 40 MILLIGRAM(S): 80 TABLET, FILM COATED ORAL at 21:17

## 2018-06-16 RX ADMIN — Medication 4: at 17:52

## 2018-06-16 RX ADMIN — LEVETIRACETAM 420 MILLIGRAM(S): 250 TABLET, FILM COATED ORAL at 13:03

## 2018-06-16 RX ADMIN — Medication 400 MILLIGRAM(S): at 15:18

## 2018-06-16 RX ADMIN — ALBUTEROL 2.5 MILLIGRAM(S): 90 AEROSOL, METERED ORAL at 20:14

## 2018-06-16 RX ADMIN — Medication 5 MILLIGRAM(S): at 22:30

## 2018-06-16 RX ADMIN — Medication 40 MILLIEQUIVALENT(S): at 02:10

## 2018-06-16 RX ADMIN — Medication 40 MILLIEQUIVALENT(S): at 21:17

## 2018-06-16 RX ADMIN — Medication 5 MILLIGRAM(S): at 11:07

## 2018-06-16 RX ADMIN — PANTOPRAZOLE SODIUM 40 MILLIGRAM(S): 20 TABLET, DELAYED RELEASE ORAL at 11:06

## 2018-06-16 RX ADMIN — POTASSIUM PHOSPHATE, MONOBASIC POTASSIUM PHOSPHATE, DIBASIC 62.5 MILLIMOLE(S): 236; 224 INJECTION, SOLUTION INTRAVENOUS at 11:08

## 2018-06-16 RX ADMIN — CHLORHEXIDINE GLUCONATE 15 MILLILITER(S): 213 SOLUTION TOPICAL at 17:13

## 2018-06-16 RX ADMIN — ALBUTEROL 2.5 MILLIGRAM(S): 90 AEROSOL, METERED ORAL at 14:44

## 2018-06-16 RX ADMIN — Medication 5 MILLIGRAM(S): at 06:02

## 2018-06-16 RX ADMIN — LISINOPRIL 10 MILLIGRAM(S): 2.5 TABLET ORAL at 15:19

## 2018-06-16 RX ADMIN — Medication 50 MILLIGRAM(S): at 17:13

## 2018-06-16 RX ADMIN — Medication 1 APPLICATION(S): at 06:02

## 2018-06-16 RX ADMIN — CHLORHEXIDINE GLUCONATE 15 MILLILITER(S): 213 SOLUTION TOPICAL at 06:02

## 2018-06-16 RX ADMIN — Medication 1 APPLICATION(S): at 11:07

## 2018-06-16 RX ADMIN — Medication 40 MILLIEQUIVALENT(S): at 17:13

## 2018-06-16 RX ADMIN — SODIUM CHLORIDE 100 MILLILITER(S): 9 INJECTION INTRAMUSCULAR; INTRAVENOUS; SUBCUTANEOUS at 01:00

## 2018-06-16 RX ADMIN — LEVETIRACETAM 420 MILLIGRAM(S): 250 TABLET, FILM COATED ORAL at 02:10

## 2018-06-16 NOTE — PROGRESS NOTE ADULT - ASSESSMENT
A/P: 72y Female tx from [facility] s/p unwitnessed fall on Coumadin/Plavix/ASA, found with anterior corpus callosum IPH and b/l IVH. Repeat CT head 6/14/18 stable. GCS: E- 2 V-1 M-5 = 8. Neuro exam stable  - Will d/w attending  - Continue Q1 neuro checks for now given currently in period of peak perihematomal cerebral edema (day 3-5) and patient remains full code, HOB 30 degrees, BP control SBP goal < 150  - Continue keppra  - Ok for chemical DVT ppx, hold all other anticoagulation/antiplatelet therapy  - Palliative care following- remains full code  - PT/OT/PM&R  - Supportive care/further medical management per SICU

## 2018-06-16 NOTE — PROGRESS NOTE ADULT - SUBJECTIVE AND OBJECTIVE BOX
INTERVAL HPI/OVERNIGHT EVENTS:  72y Female tx from [facility] s/p unwitnessed fall on Coumadin/Plavix/ASA, found with anterior corpus callosum IPH and b/l IVH. Repeat CT head 18 stable. Patient seen this AM, neuro exam stable. No acute events overnight    Vital Signs Last 24 Hrs  T(C): 37.8 (2018 08:00), Max: 37.8 (2018 08:00)  T(F): 100.1 (2018 08:00), Max: 100.1 (2018 08:00)  HR: 97 (2018 09:00) (82 - 120)  BP: --  BP(mean): --  RR: 34 (2018 09:00) (15 - 34)  SpO2: 98% (2018 09:00) (92% - 99%)    PHYSICAL EXAM:  GCS: E- 2 V-1 M- 5 = 8  GENERAL: NAD  HEAD:  Atraumatic, normocephalic  NEURO: Opens eyes to noxious stimuli. Nonverbal. Does not follow commands. PERRL ~5mm; tracks examiner to voice and movement; no facial asymmetry appreciated  MOTOR: + minimal spontaneous movement of b/l hands and localization to noxious stimuli b/l uppers, R>L. RLE/LLE withdraws to noxious, L>R  REFLEXES: LLE upgoing toes  PULM: Nonlabored breathing, no respiratory distress    LABS:                        10.7   14.1  )-----------( 222      ( 15 Lauri 2018 07:08 )             33.3     06-16    137  |  100  |  12.0  ----------------------------<  184<H>  3.9   |  22.0  |  0.33<L>    Ca    8.6      2018 07:20  Phos  2.0     06-16  Mg     2.3     06-16    PT/INR - ( 15 Lauri 2018 07:08 )   PT: 13.6 sec;   INR: 1.23 ratio    PTT - ( 15 Lauri 2018 07:08 )  PTT:30.0 sec  Urinalysis Basic - ( 2018 16:40 )    Color: Yellow / Appearance: Clear / S.010 / pH: x  Gluc: x / Ketone: Trace  / Bili: Negative / Urobili: Negative mg/dL   Blood: x / Protein: 15 mg/dL / Nitrite: Negative   Leuk Esterase: Negative / RBC: 11-25 /HPF / WBC 0-2   Sq Epi: x / Non Sq Epi: Occasional / Bacteria: x    06-15 @ 07:01  -   @ 07:00  --------------------------------------------------------  IN: 3552.3 mL / OUT: 5600 mL / NET: -2047.7 mL     @ 07: @ 09:27  --------------------------------------------------------  IN: 100 mL / OUT: 425 mL / NET: -325 mL    RADIOLOGY & ADDITIONAL TESTS:  - CT Head No Cont (18 @ 09:21)  IMPRESSION:      Stable acute multicompartment intracranial hemorrhage.     - CT Angio Head w/ IV Cont (18 @ 03:41)  IMPRESSION:   Noncontrast CT head: Interval increase in size of a parenchymal hematoma   centered on the anterior body of the corpus callosum. Interval increase   in extent of intraventricular hemorrhage, with new mild hydrocephalus.   CTA head: Motion degraded examination limiting evaluation for small   aneurysms. No CT evidence of large vessel occlusion or aneurysm is within   the limitations of the exam.

## 2018-06-16 NOTE — PROGRESS NOTE ADULT - ASSESSMENT
72yFemale presenting with: Large traumatic SAH leading to intraventricular bleed. New A-fib. continued hypokalemia, hypophosphatemia    Neurological: Conitnue Keppra and neuro checks.  PT/OT/PM&R CS    ENMT: Monitor for secretions.  However pt seems to be able to clear most secretions on own at this time.    Pulmonary: Continue resp care.  Intubate emergently if needed for secretions, resp distress or significant mental status decline until we can further address goals with family     Cardiovascular: No longer requires Cardedne.  Seems to be rate controlled and normotensive on Lopressor well enough.  Would not anticoagulate at this time given bleed.    Gastrointestinal: Continue TF until more appropriate for PO diet or PEG decided on by family.    Genitourinary: Continue Kirk since such high output still.    Heme: SCD only.  Will consider Prophylactic AC over next 24-48 hr.    ID: none    Skin: Off load    Lines/ Tubes: As above    Dispo: Critical care as above.  Goals of care discussion with family.             CRITICAL CARE TIME SPENT: 60 minutes

## 2018-06-16 NOTE — DIETITIAN INITIAL EVALUATION ADULT. - PERTINENT LABORATORY DATA
06-16 Na137 mmol/L Glu 184 mg/dL<H> K+ 3.9 mmol/L Cr  0.33 mg/dL<L> BUN 12.0 mg/dL Phos 2.0 mg/dL<L> Alb n/a   PAB n/a

## 2018-06-16 NOTE — PROGRESS NOTE ADULT - SUBJECTIVE AND OBJECTIVE BOX
INTERVAL HPI/OVERNIGHT EVENTS/SUBJECTIVE:     ICU Vital Signs Last 24 Hrs  T(C): 36.6 (2018 00:11), Max: 38 (15 Lauri 2018 04:23)  T(F): 97.9 (2018 00:11), Max: 100.4 (15 Lauri 2018 04:23)  HR: 84 (2018 02:00) (82 - 109)  BP: --  BP(mean): --  ABP: 131/89 (2018 02:00) (103/88 - 162/82)  ABP(mean): 106 (2018 02:00) (96 - 124)  RR: 19 (2018 02:00) (15 - 27)  SpO2: 98% (2018 02:00) (79% - 99%)      I&O's Detail    2018 07:01  -  15 Lauri 2018 07:00  --------------------------------------------------------  IN:    fentaNYL Infusion.: 9.6 mL    niCARdipine Infusion: 327.5 mL    sodium chloride 0.9%.: 2100 mL    Solution: 100 mL    Solution: 200 mL    Solution: 250 mL    Solution: 1000 mL  Total IN: 3987.1 mL    OUT:    Indwelling Catheter - Urethral: 4320 mL  Total OUT: 4320 mL    Total NET: -332.9 mL      15 Lauri 2018 07:01  -  2018 04:18  --------------------------------------------------------  IN:    Enteral Tube Flush: 100 mL    niCARdipine Infusion: 112.5 mL    Pivot: 390 mL    sodium chloride 0.9%.: 1900 mL    Solution: 249.9 mL    Solution: 100 mL    Solution: 50 mL  Total IN: 2902.4 mL    OUT:    Indwelling Catheter - Urethral: 4550 mL  Total OUT: 4550 mL    Total NET: -1647.6 mL            ABG - ( 15 Lauri 2018 04:37 )  pH, Arterial: 7.48  pH, Blood: x     /  pCO2: 33    /  pO2: 90    / HCO3: 25    / Base Excess: 0.9   /  SaO2: 98                  MEDICATIONS  (STANDING):  chlorhexidine 0.12% Liquid 15 milliLiter(s) Swish and Spit two times a day  levETIRAcetam  IVPB 500 milliGRAM(s) IV Intermittent every 12 hours  metoprolol tartrate Injectable 5 milliGRAM(s) IV Push every 6 hours  niCARdipine Infusion 3 mG/Hr (15 mL/Hr) IV Continuous <Continuous>  nicotine -  14 mG/24Hr(s) Patch 1 patch Transdermal daily  pantoprazole  Injectable 40 milliGRAM(s) IV Push daily  petrolatum white Ointment 1 Application(s) Topical every 6 hours  sodium chloride 0.9%. 1000 milliLiter(s) (100 mL/Hr) IV Continuous <Continuous>    MEDICATIONS  (PRN):      NUTRITION/IVF:     CENTRAL LINE:  LOCATION:   DATE INSERTED:  CVP:  SCVO2:    JOSÉ:   DATE INSERTED:    A-LINE:    LOCATION:   DATE INSERTED:   SVV:  CO/CI:     CHEST TUBE:  LOCATION:  DATE INSERTED: OUTPUT/24 HRS:  SUCTION/WATER SEAL:     NG/OG TUBE:  DATE INSERTED:  OUTPUT/24 HRS:    MISC:     PHYSICAL EXAM:     Gen:NAD, Well appearing, No cyanosis, Pallor.    Eyes: PERRL ~ 3mm, EOMI,     Neurological: A&Ox3, GCS 15, No focal defficit.     ENMT: Clear canals, clear throat.      Neck: Supple. NT AT, FROM no pain.  No JVD. No meningeal signs    Pulmonary: NAD, CTA, = BL .      Cardiovascular: RRR, S1, S2, No Murmurs, rubs or gallops noted.    Gastrointestinal:ND, Soft, NT.    Genitourinary:     Back:     Extremities: NT, AT, no edema, erythema or palpable cord noted.  FROM, = 2+ pulses throughout.    Skin:     Musculoskeletal:          LABS:  CBC Full  -  ( 15 Lauri 2018 07:08 )  WBC Count : 14.1 K/uL  Hemoglobin : 10.7 g/dL  Hematocrit : 33.3 %  Platelet Count - Automated : 222 K/uL  Mean Cell Volume : 77.1 fl  Mean Cell Hemoglobin : 24.8 pg  Mean Cell Hemoglobin Concentration : 32.1 g/dL  Auto Neutrophil # : 11.7 K/uL  Auto Lymphocyte # : 0.9 K/uL  Auto Monocyte # : 1.5 K/uL  Auto Eosinophil # : 0.0 K/uL  Auto Basophil # : 0.0 K/uL  Auto Neutrophil % : 83.1 %  Auto Lymphocyte % : 6.1 %  Auto Monocyte % : 10.4 %  Auto Eosinophil % : 0.0 %  Auto Basophil % : 0.1 %    -15    135  |  97<L>  |  9.0  ----------------------------<  174<H>  2.6<LL>   |  23.0  |  0.32<L>    Ca    8.7      15 Lauri 2018 22:27  Phos  1.5     06-15  Mg     2.0     06-15      PT/INR - ( 15 Lauri 2018 07:08 )   PT: 13.6 sec;   INR: 1.23 ratio         PTT - ( 15 Lauri 2018 07:08 )  PTT:30.0 sec  Urinalysis Basic - ( 2018 16:40 )    Color: Yellow / Appearance: Clear / S.010 / pH: x  Gluc: x / Ketone: Trace  / Bili: Negative / Urobili: Negative mg/dL   Blood: x / Protein: 15 mg/dL / Nitrite: Negative   Leuk Esterase: Negative / RBC: 11-25 /HPF / WBC 0-2   Sq Epi: x / Non Sq Epi: Occasional / Bacteria: x      RECENT CULTURES:            CAPILLARY BLOOD GLUCOSE      RADIOLOGY & ADDITIONAL STUDIES:    ASSESSMENT/PLAN:  72yFemale presenting with:        Neurological:    ENMT:    Neck:    Pulmonary:    Cardiovascular:    Gastrointestinal:    Genitourinary:    Heme:    ID:    Skin:    Lines/ Tubes:    Dispo:            CRITICAL CARE TIME SPENT: INTERVAL HPI/OVERNIGHT EVENTS/SUBJECTIVE: EEG yesterday did not reveal seizure activity.  Family met with palliative who is not decided on DNR DNI status.  Still full code at this time.  Mental status continues to wax and wane.  Dobhoff placed with TF begun.    ICU Vital Signs Last 24 Hrs  T(C): 36.6 (2018 00:11), Max: 38 (15 Lauri 2018 04:23)  T(F): 97.9 (2018 00:11), Max: 100.4 (15 Lauri 2018 04:23)  HR: 84 (2018 02:00) (82 - 109)  BP: --  BP(mean): --  ABP: 131/89 (2018 02:00) (103/88 - 162/82)  ABP(mean): 106 (2018 02:00) (96 - 124)  RR: 19 (2018 02:00) (15 - 27)  SpO2: 98% (2018 02:00) (79% - 99%)      I&O's Detail    2018 07:01  -  15 Lauri 2018 07:00  --------------------------------------------------------  IN:    fentaNYL Infusion.: 9.6 mL    niCARdipine Infusion: 327.5 mL    sodium chloride 0.9%.: 2100 mL    Solution: 100 mL    Solution: 200 mL    Solution: 250 mL    Solution: 1000 mL  Total IN: 3987.1 mL    OUT:    Indwelling Catheter - Urethral: 4320 mL  Total OUT: 4320 mL    Total NET: -332.9 mL      15 Lauri 2018 07:01  -  2018 04:18  --------------------------------------------------------  IN:    Enteral Tube Flush: 100 mL    niCARdipine Infusion: 112.5 mL    Pivot: 390 mL    sodium chloride 0.9%.: 1900 mL    Solution: 249.9 mL    Solution: 100 mL    Solution: 50 mL  Total IN: 2902.4 mL    OUT:    Indwelling Catheter - Urethral: 4550 mL  Total OUT: 4550 mL    Total NET: -1647.6 mL            ABG - ( 15 Lauri 2018 04:37 )  pH, Arterial: 7.48  pH, Blood: x     /  pCO2: 33    /  pO2: 90    / HCO3: 25    / Base Excess: 0.9   /  SaO2: 98                  MEDICATIONS  (STANDING):  chlorhexidine 0.12% Liquid 15 milliLiter(s) Swish and Spit two times a day  levETIRAcetam  IVPB 500 milliGRAM(s) IV Intermittent every 12 hours  metoprolol tartrate Injectable 5 milliGRAM(s) IV Push every 6 hours  niCARdipine Infusion 3 mG/Hr (15 mL/Hr) IV Continuous <Continuous>  nicotine -  14 mG/24Hr(s) Patch 1 patch Transdermal daily  pantoprazole  Injectable 40 milliGRAM(s) IV Push daily  petrolatum white Ointment 1 Application(s) Topical every 6 hours  sodium chloride 0.9%. 1000 milliLiter(s) (100 mL/Hr) IV Continuous <Continuous>    MEDICATIONS  (PRN):      NUTRITION/IVF:     CENTRAL LINE:  LOCATION:   DATE INSERTED:  CVP:  SCVO2:    JOSÉ:   DATE INSERTED:    A-LINE:    LOCATION:   DATE INSERTED:   SVV:  CO/CI:     CHEST TUBE:  LOCATION:  DATE INSERTED: OUTPUT/24 HRS:  SUCTION/WATER SEAL:     NG/OG TUBE:  DATE INSERTED:  OUTPUT/24 HRS:    MISC:     PHYSICAL EXAM:     Gen: NAD, Well appearing, No cyanosis, Pallor.    Eyes: PERRL ~ 3mm, EOMI,     Neurological: GCS 2/2/5.  Appears to move R UE best 5/5.  R LE 5/5.  RUE and RLE 4/5. Tracks to voice and movement.    ENMT:  clear throat.  Kangaroo tube in place.      Neck: Supple. NT AT, FROM no pain.  No JVD. No meningeal signs    Pulmonary: NAD, CTA, = BL .      Cardiovascular: Irregularly irregular, S1, S2, No Murmurs, rubs or gallops noted.    Gastrointestinal: ND, Soft, NT.    Extremities: NT, AT, no edema, erythema or palpable cord noted.  FROM, = 2+ pulses throughout.    LABS:  CBC Full  -  ( 15 Lauri 2018 07:08 )  WBC Count : 14.1 K/uL  Hemoglobin : 10.7 g/dL  Hematocrit : 33.3 %  Platelet Count - Automated : 222 K/uL  Mean Cell Volume : 77.1 fl  Mean Cell Hemoglobin : 24.8 pg  Mean Cell Hemoglobin Concentration : 32.1 g/dL  Auto Neutrophil # : 11.7 K/uL  Auto Lymphocyte # : 0.9 K/uL  Auto Monocyte # : 1.5 K/uL  Auto Eosinophil # : 0.0 K/uL  Auto Basophil # : 0.0 K/uL  Auto Neutrophil % : 83.1 %  Auto Lymphocyte % : 6.1 %  Auto Monocyte % : 10.4 %  Auto Eosinophil % : 0.0 %  Auto Basophil % : 0.1 %    06-15    135  |  97<L>  |  9.0  ----------------------------<  174<H>  2.6<LL>   |  23.0  |  0.32<L>    Ca    8.7      15 Lauri 2018 22:27  Phos  1.5     -15  Mg     2.0     -15      PT/INR - ( 15 Lauri 2018 07:08 )   PT: 13.6 sec;   INR: 1.23 ratio         PTT - ( 15 Lauri 2018 07:08 )  PTT:30.0 sec  Urinalysis Basic - ( 2018 16:40 )    Color: Yellow / Appearance: Clear / S.010 / pH: x  Gluc: x / Ketone: Trace  / Bili: Negative / Urobili: Negative mg/dL   Blood: x / Protein: 15 mg/dL / Nitrite: Negative   Leuk Esterase: Negative / RBC: 11-25 /HPF / WBC 0-2   Sq Epi: x / Non Sq Epi: Occasional / Bacteria: x      RECENT CULTURES:            CAPILLARY BLOOD GLUCOSE      RADIOLOGY & ADDITIONAL STUDIES:    ASSESSMENT/PLAN:  72yFemale presenting with: Large traumatic SAH leading to intraventricular bleed. New A-fib. continued hypokalemia, hypophosphatemia    Neurological: Conitnue Keppra and neuro checks.  PT/OT/PM&R CS    ENMT: Monitor for secretions.  However pt seems to be able to clear most secretions on own at this time.    Pulmonary: Continue resp care.  Intubate emergently if needed for secretions, resp distress or significant mental status decline until we can further address goals with family     Cardiovascular: No longer requires Cardedne.  Seems to be rate controlled and normotensive on Lopressor well enough.  Would not anticoagulate at this time given bleed.    Gastrointestinal: Continue TF until more appropriate for PO diet or PEG decided on by family.    Genitourinary: Continue José since such high output still.    Heme: SCD only.  Will consider Prophylactic AC over next 24-48 hr.    ID: none    Skin: Off load    Lines/ Tubes: As above    Dispo: Critical care as above.  Goals of care discussion with family.             CRITICAL CARE TIME SPENT: 60 minutes

## 2018-06-17 LAB
ANION GAP SERPL CALC-SCNC: 12 MMOL/L — SIGNIFICANT CHANGE UP (ref 5–17)
BASOPHILS # BLD AUTO: 0 K/UL — SIGNIFICANT CHANGE UP (ref 0–0.2)
BASOPHILS NFR BLD AUTO: 0.1 % — SIGNIFICANT CHANGE UP (ref 0–2)
BUN SERPL-MCNC: 26 MG/DL — HIGH (ref 8–20)
CALCIUM SERPL-MCNC: 8.9 MG/DL — SIGNIFICANT CHANGE UP (ref 8.6–10.2)
CHLORIDE SERPL-SCNC: 100 MMOL/L — SIGNIFICANT CHANGE UP (ref 98–107)
CO2 SERPL-SCNC: 26 MMOL/L — SIGNIFICANT CHANGE UP (ref 22–29)
CREAT SERPL-MCNC: 0.43 MG/DL — LOW (ref 0.5–1.3)
EOSINOPHIL # BLD AUTO: 0 K/UL — SIGNIFICANT CHANGE UP (ref 0–0.5)
EOSINOPHIL NFR BLD AUTO: 0.2 % — SIGNIFICANT CHANGE UP (ref 0–6)
GLUCOSE BLDC GLUCOMTR-MCNC: 147 MG/DL — HIGH (ref 70–99)
GLUCOSE BLDC GLUCOMTR-MCNC: 149 MG/DL — HIGH (ref 70–99)
GLUCOSE BLDC GLUCOMTR-MCNC: 179 MG/DL — HIGH (ref 70–99)
GLUCOSE BLDC GLUCOMTR-MCNC: 200 MG/DL — HIGH (ref 70–99)
GLUCOSE SERPL-MCNC: 209 MG/DL — HIGH (ref 70–115)
HCT VFR BLD CALC: 39.1 % — SIGNIFICANT CHANGE UP (ref 37–47)
HGB BLD-MCNC: 12.1 G/DL — SIGNIFICANT CHANGE UP (ref 12–16)
LYMPHOCYTES # BLD AUTO: 1 K/UL — SIGNIFICANT CHANGE UP (ref 1–4.8)
LYMPHOCYTES # BLD AUTO: 7.1 % — LOW (ref 20–55)
MAGNESIUM SERPL-MCNC: 2.2 MG/DL — SIGNIFICANT CHANGE UP (ref 1.6–2.6)
MCHC RBC-ENTMCNC: 24.3 PG — LOW (ref 27–31)
MCHC RBC-ENTMCNC: 30.9 G/DL — LOW (ref 32–36)
MCV RBC AUTO: 78.7 FL — LOW (ref 81–99)
MONOCYTES # BLD AUTO: 2 K/UL — HIGH (ref 0–0.8)
MONOCYTES NFR BLD AUTO: 14.3 % — HIGH (ref 3–10)
NEUTROPHILS # BLD AUTO: 11 K/UL — HIGH (ref 1.8–8)
NEUTROPHILS NFR BLD AUTO: 78.1 % — HIGH (ref 37–73)
PHOSPHATE SERPL-MCNC: 1.9 MG/DL — LOW (ref 2.4–4.7)
PLATELET # BLD AUTO: 282 K/UL — SIGNIFICANT CHANGE UP (ref 150–400)
POTASSIUM SERPL-MCNC: 4.5 MMOL/L — SIGNIFICANT CHANGE UP (ref 3.5–5.3)
POTASSIUM SERPL-SCNC: 4.5 MMOL/L — SIGNIFICANT CHANGE UP (ref 3.5–5.3)
RBC # BLD: 4.97 M/UL — SIGNIFICANT CHANGE UP (ref 4.4–5.2)
RBC # FLD: 17.8 % — HIGH (ref 11–15.6)
SODIUM SERPL-SCNC: 138 MMOL/L — SIGNIFICANT CHANGE UP (ref 135–145)
WBC # BLD: 14 K/UL — HIGH (ref 4.8–10.8)
WBC # FLD AUTO: 14 K/UL — HIGH (ref 4.8–10.8)

## 2018-06-17 PROCEDURE — 99291 CRITICAL CARE FIRST HOUR: CPT

## 2018-06-17 RX ORDER — ALBUTEROL 90 UG/1
2.5 AEROSOL, METERED ORAL EVERY 6 HOURS
Qty: 0 | Refills: 0 | Status: DISCONTINUED | OUTPATIENT
Start: 2018-06-17 | End: 2018-06-21

## 2018-06-17 RX ORDER — MAGNESIUM SULFATE 500 MG/ML
2 VIAL (ML) INJECTION ONCE
Qty: 0 | Refills: 0 | Status: DISCONTINUED | OUTPATIENT
Start: 2018-06-17 | End: 2018-06-17

## 2018-06-17 RX ORDER — METOPROLOL TARTRATE 50 MG
50 TABLET ORAL EVERY 6 HOURS
Qty: 0 | Refills: 0 | Status: DISCONTINUED | OUTPATIENT
Start: 2018-06-17 | End: 2018-06-17

## 2018-06-17 RX ORDER — METOPROLOL TARTRATE 50 MG
50 TABLET ORAL ONCE
Qty: 0 | Refills: 0 | Status: COMPLETED | OUTPATIENT
Start: 2018-06-17 | End: 2018-06-17

## 2018-06-17 RX ORDER — ENOXAPARIN SODIUM 100 MG/ML
40 INJECTION SUBCUTANEOUS DAILY
Qty: 0 | Refills: 0 | Status: DISCONTINUED | OUTPATIENT
Start: 2018-06-17 | End: 2018-06-20

## 2018-06-17 RX ORDER — METOPROLOL TARTRATE 50 MG
50 TABLET ORAL EVERY 12 HOURS
Qty: 0 | Refills: 0 | Status: DISCONTINUED | OUTPATIENT
Start: 2018-06-18 | End: 2018-06-20

## 2018-06-17 RX ORDER — ACETAMINOPHEN 500 MG
1000 TABLET ORAL ONCE
Qty: 0 | Refills: 0 | Status: COMPLETED | OUTPATIENT
Start: 2018-06-17 | End: 2018-06-17

## 2018-06-17 RX ADMIN — Medication 1 PATCH: at 11:44

## 2018-06-17 RX ADMIN — Medication 1000 MILLIGRAM(S): at 08:46

## 2018-06-17 RX ADMIN — Medication 50 MILLIGRAM(S): at 11:40

## 2018-06-17 RX ADMIN — LEVETIRACETAM 420 MILLIGRAM(S): 250 TABLET, FILM COATED ORAL at 14:10

## 2018-06-17 RX ADMIN — ATORVASTATIN CALCIUM 40 MILLIGRAM(S): 80 TABLET, FILM COATED ORAL at 21:16

## 2018-06-17 RX ADMIN — LISINOPRIL 10 MILLIGRAM(S): 2.5 TABLET ORAL at 06:12

## 2018-06-17 RX ADMIN — Medication 400 MILLIGRAM(S): at 08:31

## 2018-06-17 RX ADMIN — Medication 50 MILLIGRAM(S): at 06:12

## 2018-06-17 RX ADMIN — Medication 1 APPLICATION(S): at 17:41

## 2018-06-17 RX ADMIN — ENOXAPARIN SODIUM 40 MILLIGRAM(S): 100 INJECTION SUBCUTANEOUS at 11:40

## 2018-06-17 RX ADMIN — Medication 1 APPLICATION(S): at 11:44

## 2018-06-17 RX ADMIN — Medication 1 APPLICATION(S): at 00:18

## 2018-06-17 RX ADMIN — Medication 2: at 06:12

## 2018-06-17 RX ADMIN — Medication 2: at 11:40

## 2018-06-17 RX ADMIN — Medication 62.5 MILLIMOLE(S): at 10:13

## 2018-06-17 RX ADMIN — Medication 50 MILLIGRAM(S): at 03:16

## 2018-06-17 RX ADMIN — PANTOPRAZOLE SODIUM 40 MILLIGRAM(S): 20 TABLET, DELAYED RELEASE ORAL at 11:40

## 2018-06-17 RX ADMIN — Medication 1 APPLICATION(S): at 05:21

## 2018-06-17 RX ADMIN — LEVETIRACETAM 420 MILLIGRAM(S): 250 TABLET, FILM COATED ORAL at 02:22

## 2018-06-17 NOTE — PHYSICAL THERAPY INITIAL EVALUATION ADULT - MUSCLE TONE ASSESSMENT, REHAB EVAL
moderately increased tone/bilateral upper extremities/normal/bilateral lower extremities/severely increased tone

## 2018-06-17 NOTE — PROGRESS NOTE ADULT - ASSESSMENT
72yFemale presenting with: Traumatic SAH --> intraventricular bleed.  Hypokalemia    Neurological: Will order PM&R if family still undecided about code status today.    Pulmonary: IS, Pulm care and secretion clearance as needed although pt appears to clear on own well enough    Cardiovascular: Maintain SBP < 160-180 and MAP > 70 as much as possible with PO meds.  Increase dosage as needed    Gastrointestinal: CW TF at this time.  If Neuro status improves we will attempt speech CS.  If not, further discussion about PEG with family near end of week    Genitourinary: PROSPER Kirk today since U/O more NL.  Unlikely DI or SIADH given serum Na and osm    Heme: Will start Lovenox today as pt >72 hr post stable head CT and still stable exam    ID: None    Skin: Continue offload    Lines/ Tubes: Plan as above    Dispo: Critical care as above            CRITICAL CARE TIME SPENT: 50 minute 72yFemale presenting with: Traumatic SAH --> intraventricular bleed.  Hypokalemia    Neurological: Will order PM&R if family still undecided about code status today.    Pulmonary: IS, Pulm care and secretion clearance as needed although pt appears to clear on own well enough    Cardiovascular: Maintain SBP < 160-180 and MAP > 70 as much as possible with PO meds.  Increase dosage as needed    Gastrointestinal: CW TF at this time.  If Neuro status improves we will attempt speech CS.  If not, further discussion about PEG with family near end of week    Genitourinary: PROSPER Kirk today since U/O more NL.  Unlikely DI or SIADH given serum Na and osm    Heme: Will start Lovenox today as pt >72 hr post stable head CT and still stable exam    ID: None    Skin: Continue offload    Lines/ Tubes: Plan as above    Dispo: Critical care as above. Will remain in SICU given tenuous resp status and waxing and waning AMS which either could rapidly decline and cause death.            CRITICAL CARE TIME SPENT: 50 minute

## 2018-06-17 NOTE — PHYSICAL THERAPY INITIAL EVALUATION ADULT - ADDITIONAL COMMENTS
Pt. lives in a house with her  with 4 to enter with rail and no stairs inside. Pt. was independent PTA. No DME. Pt.  reports pt. was unsteady at times and he would hold onto her while walking sometimes.

## 2018-06-17 NOTE — PROGRESS NOTE ADULT - SUBJECTIVE AND OBJECTIVE BOX
INTERVAL HPI/OVERNIGHT EVENTS/SUBJECTIVE: Tolerated TF.  PO BB started. IVL, 1 dose of IVP lopressor for better rate control and HTN given.  ISS started for mild hyperglycemia.  Electrolytes still low.  replaced lytes.  U/O improving and lower into more normal ranges. A-line removed.    ICU Vital Signs Last 24 Hrs  T(C): 37.4 (2018 00:00), Max: 38.6 (2018 12:00)  T(F): 99.3 (2018 00:00), Max: 101.5 (2018 12:00)  HR: 90 (2018 02:00) (67 - 122)  BP: 179/103 (2018 02:00) (148/108 - 179/103)  BP(mean): 131 (2018 02:00) (107 - 131)  ABP: 122/107 (2018 09:00) (113/88 - 137/83)  ABP(mean): 115 (2018 09:00) (101 - 115)  RR: 23 (2018 02:00) (18 - 38)  SpO2: 99% (2018 02:00) (97% - 100%)      I&O's Detail    15 Lauri 2018 07:01  -  2018 07:00  --------------------------------------------------------  IN:    Enteral Tube Flush: 100 mL    niCARdipine Infusion: 112.5 mL    Pivot: 590 mL    sodium chloride 0.9%: 2100 mL    Solution: 100 mL    Solution: 50 mL    Solution: 499.8 mL  Total IN: 3552.3 mL    OUT:    Indwelling Catheter - Urethral: 5600 mL  Total OUT: 5600 mL    Total NET: -2047.7 mL      2018 07:01  -  2018 02:48  --------------------------------------------------------  IN:    Enteral Tube Flush: 470 mL    Pivot: 1000 mL    Solution: 200 mL    Solution: 250 mL  Total IN: 1920 mL    OUT:    Indwelling Catheter - Urethral: 1705 mL  Total OUT: 1705 mL    Total NET: 215 mL            ABG - ( 15 Lauri 2018 04:37 )  pH, Arterial: 7.48  pH, Blood: x     /  pCO2: 33    /  pO2: 90    / HCO3: 25    / Base Excess: 0.9   /  SaO2: 98                  MEDICATIONS  (STANDING):  ALBUTerol    0.083% 2.5 milliGRAM(s) Nebulizer every 6 hours  atorvastatin 40 milliGRAM(s) Oral at bedtime  chlorhexidine 0.12% Liquid 15 milliLiter(s) Swish and Spit two times a day  insulin lispro (HumaLOG) corrective regimen sliding scale   SubCutaneous every 6 hours  levETIRAcetam  IVPB 500 milliGRAM(s) IV Intermittent every 12 hours  lisinopril 10 milliGRAM(s) Oral daily  metoprolol tartrate 50 milliGRAM(s) Oral two times a day  nicotine -  14 mG/24Hr(s) Patch 1 patch Transdermal daily  pantoprazole  Injectable 40 milliGRAM(s) IV Push daily  petrolatum white Ointment 1 Application(s) Topical every 6 hours    MEDICATIONS  (PRN):      NUTRITION/IVF: Pivot @ 50/ IVL    JOSÉ:   Yes    NG/TUBE:  yes    PHYSICAL EXAM:     Gen: NAD, Well appearing, No cyanosis, Pallor.    Eyes: PERRL ~ 3mm, EOMI,     Neurological:  GCS 1/2/5. Moves R UE freely with 5/5 strength.  DARBY 4/5.  Difficult to get clear exam of LE but appears to have 5/5 strength.     ENMT: clear throat. Kangaroo tube in place.      Pulmonary: NAD, CTA, = BL .      Cardiovascular: Irregularly irregular, S1, S2, No Murmurs, rubs or gallops noted.    Gastrointestinal: ND, Soft, NT.    Genitourinary: José    Back: No skin breakdown    Extremities: NT, AT, no edema, erythema or palpable cord noted.  FROM, = 2+ pulses throughout.    Skin: No breakdown      LABS:  CBC Full  -  ( 2018 07:20 )  WBC Count : 15.6 K/uL  Hemoglobin : 12.3 g/dL  Hematocrit : 38.2 %  Platelet Count - Automated : 239 K/uL  Mean Cell Volume : 76.6 fl  Mean Cell Hemoglobin : 24.6 pg  Mean Cell Hemoglobin Concentration : 32.2 g/dL  Auto Neutrophil # : 11.8 K/uL  Auto Lymphocyte # : 1.3 K/uL  Auto Monocyte # : 2.4 K/uL  Auto Eosinophil # : 0.0 K/uL  Auto Basophil # : 0.0 K/uL  Auto Neutrophil % : 75.7 %  Auto Lymphocyte % : 8.4 %  Auto Monocyte % : 15.3 %  Auto Eosinophil % : 0.3 %  Auto Basophil % : 0.1 %        138  |  98  |  16.0  ----------------------------<  198<H>  3.4<L>   |  25.0  |  0.38<L>    Ca    9.1      2018 13:50  Phos  2.0       Mg     2.3     16      PT/INR - ( 15 Lauri 2018 07:08 )   PT: 13.6 sec;   INR: 1.23 ratio         PTT - ( 15 Lauri 2018 07:08 )  PTT:30.0 sec  Urinalysis Basic - ( 2018 13:50 )    Color: Yellow / Appearance: Clear / S.010 / pH: x  Gluc: x / Ketone: Negative  / Bili: Negative / Urobili: 1 mg/dL   Blood: x / Protein: 100 mg/dL / Nitrite: Negative   Leuk Esterase: Negative / RBC: 3-5 /HPF / WBC 0-2   Sq Epi: x / Non Sq Epi: Occasional / Bacteria: x      ASSESSMENT/PLAN:  72yFemale presenting with: Traumatic SAH --> intraventricular bleed.  Hypokalemia    Neurological: Will order PM&R if family still undecided about code status today.    Pulmonary: IS, Pulm care and secretion clearance as needed although pt appears to clear on own well enough    Cardiovascular: Maintain SBP < 160-180 and MAP > 70 as much as possible with PO meds.  Increase dosage as needed    Gastrointestinal: CW TF at this time.  If Neuro status improves we will attempt speech CS.  If not, further discussion about PEG with family near end of week    Genitourinary: PROSPER José today since U/O more NL.  Unlikely DI or SIADH given serum Na and osm    Heme: Will start Lovenox today as pt >72 hr post stable head CT and still stable exam    ID: None    Skin: Continue offload    Lines/ Tubes: Plan as above    Dispo: Critical care as above            CRITICAL CARE TIME SPENT: 50 minute INTERVAL HPI/OVERNIGHT EVENTS/SUBJECTIVE: Tolerated TF.  PO BB started. IVL, 1 dose of IVP lopressor for better rate control and HTN given.  ISS started for mild hyperglycemia.  Electrolytes still low.  replaced lytes.  U/O improving and lower into more normal ranges. A-line removed.    ICU Vital Signs Last 24 Hrs  T(C): 37.4 (2018 00:00), Max: 38.6 (2018 12:00)  T(F): 99.3 (2018 00:00), Max: 101.5 (2018 12:00)  HR: 90 (2018 02:00) (67 - 122)  BP: 179/103 (2018 02:00) (148/108 - 179/103)  BP(mean): 131 (2018 02:00) (107 - 131)  ABP: 122/107 (2018 09:00) (113/88 - 137/83)  ABP(mean): 115 (2018 09:00) (101 - 115)  RR: 23 (2018 02:00) (18 - 38)  SpO2: 99% (2018 02:00) (97% - 100%)      I&O's Detail    15 Lauri 2018 07:01  -  2018 07:00  --------------------------------------------------------  IN:    Enteral Tube Flush: 100 mL    niCARdipine Infusion: 112.5 mL    Pivot: 590 mL    sodium chloride 0.9%: 2100 mL    Solution: 100 mL    Solution: 50 mL    Solution: 499.8 mL  Total IN: 3552.3 mL    OUT:    Indwelling Catheter - Urethral: 5600 mL  Total OUT: 5600 mL    Total NET: -2047.7 mL      2018 07:01  -  2018 02:48  --------------------------------------------------------  IN:    Enteral Tube Flush: 470 mL    Pivot: 1000 mL    Solution: 200 mL    Solution: 250 mL  Total IN: 1920 mL    OUT:    Indwelling Catheter - Urethral: 1705 mL  Total OUT: 1705 mL    Total NET: 215 mL            ABG - ( 15 Lauri 2018 04:37 )  pH, Arterial: 7.48  pH, Blood: x     /  pCO2: 33    /  pO2: 90    / HCO3: 25    / Base Excess: 0.9   /  SaO2: 98                  MEDICATIONS  (STANDING):  ALBUTerol    0.083% 2.5 milliGRAM(s) Nebulizer every 6 hours  atorvastatin 40 milliGRAM(s) Oral at bedtime  chlorhexidine 0.12% Liquid 15 milliLiter(s) Swish and Spit two times a day  insulin lispro (HumaLOG) corrective regimen sliding scale   SubCutaneous every 6 hours  levETIRAcetam  IVPB 500 milliGRAM(s) IV Intermittent every 12 hours  lisinopril 10 milliGRAM(s) Oral daily  metoprolol tartrate 50 milliGRAM(s) Oral two times a day  nicotine -  14 mG/24Hr(s) Patch 1 patch Transdermal daily  pantoprazole  Injectable 40 milliGRAM(s) IV Push daily  petrolatum white Ointment 1 Application(s) Topical every 6 hours    MEDICATIONS  (PRN):      NUTRITION/IVF: Pivot @ 50/ IVL    JOSÉ:   Yes    NG/TUBE:  yes    PHYSICAL EXAM:     Gen: NAD, Well appearing, No cyanosis, Pallor.    Eyes: PERRL ~ 3mm, EOMI,     Neurological:  GCS 1/2/5. Moves R UE freely with 5/5 strength.  DARBY 4/5.  Difficult to get clear exam of LE but appears to have 5/5 strength.     ENMT: clear throat. Kangaroo tube in place.      Pulmonary: NAD, CTA, = BL .      Cardiovascular: Irregularly irregular, S1, S2, No Murmurs, rubs or gallops noted.    Gastrointestinal: ND, Soft, NT.    Genitourinary: José    Back: No skin breakdown    Extremities: NT, AT, no edema, erythema or palpable cord noted.  FROM, = 2+ pulses throughout.    Skin: No breakdown      LABS:  CBC Full  -  ( 2018 07:20 )  WBC Count : 15.6 K/uL  Hemoglobin : 12.3 g/dL  Hematocrit : 38.2 %  Platelet Count - Automated : 239 K/uL  Mean Cell Volume : 76.6 fl  Mean Cell Hemoglobin : 24.6 pg  Mean Cell Hemoglobin Concentration : 32.2 g/dL  Auto Neutrophil # : 11.8 K/uL  Auto Lymphocyte # : 1.3 K/uL  Auto Monocyte # : 2.4 K/uL  Auto Eosinophil # : 0.0 K/uL  Auto Basophil # : 0.0 K/uL  Auto Neutrophil % : 75.7 %  Auto Lymphocyte % : 8.4 %  Auto Monocyte % : 15.3 %  Auto Eosinophil % : 0.3 %  Auto Basophil % : 0.1 %        138  |  98  |  16.0  ----------------------------<  198<H>  3.4<L>   |  25.0  |  0.38<L>    Ca    9.1      2018 13:50  Phos  2.0       Mg     2.3     16      PT/INR - ( 15 Lauri 2018 07:08 )   PT: 13.6 sec;   INR: 1.23 ratio         PTT - ( 15 Lauri 2018 07:08 )  PTT:30.0 sec  Urinalysis Basic - ( 2018 13:50 )    Color: Yellow / Appearance: Clear / S.010 / pH: x  Gluc: x / Ketone: Negative  / Bili: Negative / Urobili: 1 mg/dL   Blood: x / Protein: 100 mg/dL / Nitrite: Negative   Leuk Esterase: Negative / RBC: 3-5 /HPF / WBC 0-2   Sq Epi: x / Non Sq Epi: Occasional / Bacteria: x      ASSESSMENT/PLAN:  72yFemale presenting with: Traumatic SAH --> intraventricular bleed.  Hypokalemia    Neurological: Will order PM&R if family still undecided about code status today.    Pulmonary: IS, Pulm care and secretion clearance as needed although pt appears to clear on own well enough    Cardiovascular: Maintain SBP < 160-180 and MAP > 70 as much as possible with PO meds.  Increase dosage as needed    Gastrointestinal: CW TF at this time.  If Neuro status improves we will attempt speech CS.  If not, further discussion about PEG with family near end of week    Genitourinary: PROSPER José today since U/O more NL.  Unlikely DI or SIADH given serum Na and osm    Heme: Will start Lovenox today as pt >72 hr post stable head CT and still stable exam    ID: None    Skin: Continue offload    Lines/ Tubes: Plan as above    Dispo: Critical care as above.  Will remain in SICU given tenuous resp status and waxing and waning AMS which either could rapidly decline and cause death.      CRITICAL CARE TIME SPENT: 30 minute

## 2018-06-17 NOTE — PHYSICAL THERAPY INITIAL EVALUATION ADULT - IMPAIRMENTS FOUND, PT EVAL
gait, locomotion, and balance/tone/gross motor/arousal, attention, and cognition/neuromotor development and sensory integration

## 2018-06-17 NOTE — PHYSICAL THERAPY INITIAL EVALUATION ADULT - PERTINENT HX OF CURRENT PROBLEM, REHAB EVAL
As per H&P: 72F transfer from [place] with report of fall on coumadin and plavix but altered. ED reports patient was verbal on arrival but somnolent and intubated due to protect airway. Transferred for large intracranial bleed.

## 2018-06-18 LAB
ANION GAP SERPL CALC-SCNC: 14 MMOL/L — SIGNIFICANT CHANGE UP (ref 5–17)
BASOPHILS # BLD AUTO: 0 K/UL — SIGNIFICANT CHANGE UP (ref 0–0.2)
BASOPHILS NFR BLD AUTO: 0.1 % — SIGNIFICANT CHANGE UP (ref 0–2)
BUN SERPL-MCNC: 26 MG/DL — HIGH (ref 8–20)
CALCIUM SERPL-MCNC: 9.5 MG/DL — SIGNIFICANT CHANGE UP (ref 8.6–10.2)
CHLORIDE SERPL-SCNC: 97 MMOL/L — LOW (ref 98–107)
CO2 SERPL-SCNC: 29 MMOL/L — SIGNIFICANT CHANGE UP (ref 22–29)
CREAT SERPL-MCNC: 0.43 MG/DL — LOW (ref 0.5–1.3)
EOSINOPHIL # BLD AUTO: 0 K/UL — SIGNIFICANT CHANGE UP (ref 0–0.5)
EOSINOPHIL NFR BLD AUTO: 0.1 % — SIGNIFICANT CHANGE UP (ref 0–6)
GLUCOSE BLDC GLUCOMTR-MCNC: 130 MG/DL — HIGH (ref 70–99)
GLUCOSE BLDC GLUCOMTR-MCNC: 178 MG/DL — HIGH (ref 70–99)
GLUCOSE BLDC GLUCOMTR-MCNC: 180 MG/DL — HIGH (ref 70–99)
GLUCOSE BLDC GLUCOMTR-MCNC: 193 MG/DL — HIGH (ref 70–99)
GLUCOSE BLDC GLUCOMTR-MCNC: 196 MG/DL — HIGH (ref 70–99)
GLUCOSE SERPL-MCNC: 194 MG/DL — HIGH (ref 70–115)
HCT VFR BLD CALC: 41.8 % — SIGNIFICANT CHANGE UP (ref 37–47)
HGB BLD-MCNC: 13.3 G/DL — SIGNIFICANT CHANGE UP (ref 12–16)
LYMPHOCYTES # BLD AUTO: 1.2 K/UL — SIGNIFICANT CHANGE UP (ref 1–4.8)
LYMPHOCYTES # BLD AUTO: 9.8 % — LOW (ref 20–55)
MAGNESIUM SERPL-MCNC: 2.3 MG/DL — SIGNIFICANT CHANGE UP (ref 1.6–2.6)
MCHC RBC-ENTMCNC: 25.2 PG — LOW (ref 27–31)
MCHC RBC-ENTMCNC: 31.8 G/DL — LOW (ref 32–36)
MCV RBC AUTO: 79.2 FL — LOW (ref 81–99)
MONOCYTES # BLD AUTO: 1.7 K/UL — HIGH (ref 0–0.8)
MONOCYTES NFR BLD AUTO: 13.7 % — HIGH (ref 3–10)
NEUTROPHILS # BLD AUTO: 9.5 K/UL — HIGH (ref 1.8–8)
NEUTROPHILS NFR BLD AUTO: 76.1 % — HIGH (ref 37–73)
PHOSPHATE SERPL-MCNC: 3.4 MG/DL — SIGNIFICANT CHANGE UP (ref 2.4–4.7)
PLATELET # BLD AUTO: 282 K/UL — SIGNIFICANT CHANGE UP (ref 150–400)
POTASSIUM SERPL-MCNC: 4.2 MMOL/L — SIGNIFICANT CHANGE UP (ref 3.5–5.3)
POTASSIUM SERPL-SCNC: 4.2 MMOL/L — SIGNIFICANT CHANGE UP (ref 3.5–5.3)
RBC # BLD: 5.28 M/UL — HIGH (ref 4.4–5.2)
RBC # FLD: 17.8 % — HIGH (ref 11–15.6)
SODIUM SERPL-SCNC: 140 MMOL/L — SIGNIFICANT CHANGE UP (ref 135–145)
WBC # BLD: 12.4 K/UL — HIGH (ref 4.8–10.8)
WBC # FLD AUTO: 12.4 K/UL — HIGH (ref 4.8–10.8)

## 2018-06-18 PROCEDURE — 99233 SBSQ HOSP IP/OBS HIGH 50: CPT

## 2018-06-18 PROCEDURE — 99223 1ST HOSP IP/OBS HIGH 75: CPT

## 2018-06-18 RX ORDER — LEVETIRACETAM 250 MG/1
500 TABLET, FILM COATED ORAL
Qty: 0 | Refills: 0 | Status: DISCONTINUED | OUTPATIENT
Start: 2018-06-18 | End: 2018-06-20

## 2018-06-18 RX ORDER — ACETAMINOPHEN 500 MG
1000 TABLET ORAL ONCE
Qty: 0 | Refills: 0 | Status: COMPLETED | OUTPATIENT
Start: 2018-06-18 | End: 2018-06-18

## 2018-06-18 RX ORDER — ACETAMINOPHEN 500 MG
650 TABLET ORAL EVERY 6 HOURS
Qty: 0 | Refills: 0 | Status: DISCONTINUED | OUTPATIENT
Start: 2018-06-18 | End: 2018-06-19

## 2018-06-18 RX ORDER — ONDANSETRON 8 MG/1
4 TABLET, FILM COATED ORAL ONCE
Qty: 0 | Refills: 0 | Status: COMPLETED | OUTPATIENT
Start: 2018-06-18 | End: 2018-06-18

## 2018-06-18 RX ADMIN — Medication 2: at 00:21

## 2018-06-18 RX ADMIN — Medication 400 MILLIGRAM(S): at 09:07

## 2018-06-18 RX ADMIN — Medication 2: at 06:07

## 2018-06-18 RX ADMIN — Medication 1 APPLICATION(S): at 23:20

## 2018-06-18 RX ADMIN — Medication 1 PATCH: at 11:30

## 2018-06-18 RX ADMIN — PANTOPRAZOLE SODIUM 40 MILLIGRAM(S): 20 TABLET, DELAYED RELEASE ORAL at 11:28

## 2018-06-18 RX ADMIN — Medication 1 APPLICATION(S): at 11:30

## 2018-06-18 RX ADMIN — Medication 1000 MILLIGRAM(S): at 09:07

## 2018-06-18 RX ADMIN — Medication 1 APPLICATION(S): at 06:06

## 2018-06-18 RX ADMIN — Medication 2: at 11:29

## 2018-06-18 RX ADMIN — Medication 1 APPLICATION(S): at 00:19

## 2018-06-18 RX ADMIN — Medication 50 MILLIGRAM(S): at 11:29

## 2018-06-18 RX ADMIN — Medication 2: at 23:20

## 2018-06-18 RX ADMIN — Medication 650 MILLIGRAM(S): at 23:36

## 2018-06-18 RX ADMIN — ENOXAPARIN SODIUM 40 MILLIGRAM(S): 100 INJECTION SUBCUTANEOUS at 11:29

## 2018-06-18 RX ADMIN — ATORVASTATIN CALCIUM 40 MILLIGRAM(S): 80 TABLET, FILM COATED ORAL at 23:17

## 2018-06-18 RX ADMIN — ONDANSETRON 4 MILLIGRAM(S): 8 TABLET, FILM COATED ORAL at 00:37

## 2018-06-18 RX ADMIN — LEVETIRACETAM 420 MILLIGRAM(S): 250 TABLET, FILM COATED ORAL at 02:00

## 2018-06-18 RX ADMIN — LEVETIRACETAM 500 MILLIGRAM(S): 250 TABLET, FILM COATED ORAL at 17:20

## 2018-06-18 RX ADMIN — Medication 50 MILLIGRAM(S): at 00:21

## 2018-06-18 RX ADMIN — LISINOPRIL 10 MILLIGRAM(S): 2.5 TABLET ORAL at 06:07

## 2018-06-18 RX ADMIN — Medication 1 APPLICATION(S): at 17:21

## 2018-06-18 RX ADMIN — Medication 50 MILLIGRAM(S): at 23:17

## 2018-06-18 NOTE — OCCUPATIONAL THERAPY INITIAL EVALUATION ADULT - RANGE OF MOTION EXAMINATION
pt does not move to command; spontaneous movements noted in bilat digit flexion, otherwise no active movement

## 2018-06-18 NOTE — OCCUPATIONAL THERAPY INITIAL EVALUATION ADULT - PLANNED THERAPY INTERVENTIONS, OT EVAL
motor coordination training/transfer training/cognitive, visual perceptual/neuromuscular re-education/ADL retraining/strengthening/balance training/bed mobility training

## 2018-06-18 NOTE — OCCUPATIONAL THERAPY INITIAL EVALUATION ADULT - ADDITIONAL COMMENTS
Pt is a poor historian and unable to answer social history questions  Unknown prior functional status

## 2018-06-18 NOTE — PROGRESS NOTE ADULT - SUBJECTIVE AND OBJECTIVE BOX
INTERVAL HPI/OVERNIGHT EVENTS/SUBJECTIVE:    ICU Vital Signs Last 24 Hrs  T(C): 38.4 (2018 08:00), Max: 38.4 (2018 08:00)  T(F): 101.2 (2018 08:00), Max: 101.2 (2018 08:00)  HR: 82 (2018 10:00) (62 - 105)  BP: 152/65 (2018 10:00) (104/62 - 169/88)  BP(mean): 94 (2018 10:00) (75 - 124)  ABP: --  ABP(mean): --  RR: 21 (2018 10:00) (19 - 32)  SpO2: 99% (2018 10:00) (95% - 100%)      I&O's Detail    2018 07:01  -  2018 07:00  --------------------------------------------------------  IN:    Enteral Tube Flush: 220 mL    Pivot: 850 mL    Solution: 200 mL    Solution: 100 mL    Solution: 250 mL  Total IN: 1620 mL    OUT:    Indwelling Catheter - Urethral: 2275 mL  Total OUT: 2275 mL    Total NET: -655 mL      2018 07:01  -  2018 10:24  --------------------------------------------------------  IN:    Solution: 100 mL  Total IN: 100 mL    OUT:    Indwelling Catheter - Urethral: 200 mL  Total OUT: 200 mL    Total NET: -100 mL                MEDICATIONS  (STANDING):  atorvastatin 40 milliGRAM(s) Oral at bedtime  enoxaparin Injectable 40 milliGRAM(s) SubCutaneous daily  insulin lispro (HumaLOG) corrective regimen sliding scale   SubCutaneous every 6 hours  levETIRAcetam  IVPB 500 milliGRAM(s) IV Intermittent every 12 hours  lisinopril 10 milliGRAM(s) Oral daily  metoprolol tartrate 50 milliGRAM(s) Oral every 12 hours  nicotine -  14 mG/24Hr(s) Patch 1 patch Transdermal daily  pantoprazole  Injectable 40 milliGRAM(s) IV Push daily  petrolatum white Ointment 1 Application(s) Topical every 6 hours    MEDICATIONS  (PRN):  ALBUTerol    0.083% 2.5 milliGRAM(s) Nebulizer every 6 hours PRN Shortness of Breath and/or Wheezing      NUTRITION/IVF:     CENTRAL LINE:  LOCATION:   DATE INSERTED:  CVP:  SCVO2:    JOSÉ:   DATE INSERTED:    A-LINE:    LOCATION:   DATE INSERTED:   SVV:  CO/CI:     CHEST TUBE:  LOCATION:  DATE INSERTED: OUTPUT/24 HRS:  SUCTION/WATER SEAL:     NG/OG TUBE:  DATE INSERTED:  OUTPUT/24 HRS:    MISC:     PHYSICAL EXAM:    Gen:    Eyes:    Neurological:    ENMT:    Neck:    Pulmonary:    Cardiovascular:    Gastrointestinal:    Genitourinary:    Back:    Extremities:    Skin:    Musculoskeletal:          LABS:  CBC Full  -  ( 2018 05:49 )  WBC Count : 12.4 K/uL  Hemoglobin : 13.3 g/dL  Hematocrit : 41.8 %  Platelet Count - Automated : 282 K/uL  Mean Cell Volume : 79.2 fl  Mean Cell Hemoglobin : 25.2 pg  Mean Cell Hemoglobin Concentration : 31.8 g/dL  Auto Neutrophil # : 9.5 K/uL  Auto Lymphocyte # : 1.2 K/uL  Auto Monocyte # : 1.7 K/uL  Auto Eosinophil # : 0.0 K/uL  Auto Basophil # : 0.0 K/uL  Auto Neutrophil % : 76.1 %  Auto Lymphocyte % : 9.8 %  Auto Monocyte % : 13.7 %  Auto Eosinophil % : 0.1 %  Auto Basophil % : 0.1 %        140  |  97<L>  |  26.0<H>  ----------------------------<  194<H>  4.2   |  29.0  |  0.43<L>    Ca    9.5      2018 05:49  Phos  3.4     06-18  Mg     2.3     06-18        Urinalysis Basic - ( 2018 13:50 )    Color: Yellow / Appearance: Clear / S.010 / pH: x  Gluc: x / Ketone: Negative  / Bili: Negative / Urobili: 1 mg/dL   Blood: x / Protein: 100 mg/dL / Nitrite: Negative   Leuk Esterase: Negative / RBC: 3-5 /HPF / WBC 0-2   Sq Epi: x / Non Sq Epi: Occasional / Bacteria: x      RECENT CULTURES:            CAPILLARY BLOOD GLUCOSE      RADIOLOGY & ADDITIONAL STUDIES:    ASSESSMENT/PLAN:  72yFemale presenting with:    Neuro:    HEENT:    CV:    Pulm:    GI/Nutrition:    /Renal:    ID:    Lines/Tubes:    Endo:    Skin:    Proph:    Dispo:      CRITICAL CARE TIME SPENT: INTERVAL HPI/OVERNIGHT EVENTS/SUBJECTIVE: emesis overnight, no gastric distention of cxr.     ICU Vital Signs Last 24 Hrs  T(C): 38.4 (2018 08:00), Max: 38.4 (2018 08:00)  T(F): 101.2 (2018 08:00), Max: 101.2 (2018 08:00)  HR: 82 (2018 10:00) (62 - 105)  BP: 152/65 (2018 10:00) (104/62 - 169/88)  BP(mean): 94 (2018 10:00) (75 - 124)  ABP: --  ABP(mean): --  RR: 21 (2018 10:00) (19 - 32)  SpO2: 99% (2018 10:00) (95% - 100%)      I&O's Detail    2018 07:01  -  2018 07:00  --------------------------------------------------------  IN:    Enteral Tube Flush: 220 mL    Pivot: 850 mL    Solution: 200 mL    Solution: 100 mL    Solution: 250 mL  Total IN: 1620 mL    OUT:    Indwelling Catheter - Urethral: 2275 mL  Total OUT: 2275 mL    Total NET: -655 mL      2018 07:01  -  2018 10:24  --------------------------------------------------------  IN:    Solution: 100 mL  Total IN: 100 mL    OUT:    Indwelling Catheter - Urethral: 200 mL  Total OUT: 200 mL    Total NET: -100 mL                MEDICATIONS  (STANDING):  atorvastatin 40 milliGRAM(s) Oral at bedtime  enoxaparin Injectable 40 milliGRAM(s) SubCutaneous daily  insulin lispro (HumaLOG) corrective regimen sliding scale   SubCutaneous every 6 hours  levETIRAcetam  IVPB 500 milliGRAM(s) IV Intermittent every 12 hours  lisinopril 10 milliGRAM(s) Oral daily  metoprolol tartrate 50 milliGRAM(s) Oral every 12 hours  nicotine -  14 mG/24Hr(s) Patch 1 patch Transdermal daily  pantoprazole  Injectable 40 milliGRAM(s) IV Push daily  petrolatum white Ointment 1 Application(s) Topical every 6 hours    MEDICATIONS  (PRN):  ALBUTerol    0.083% 2.5 milliGRAM(s) Nebulizer every 6 hours PRN Shortness of Breath and/or Wheezing          PHYSICAL EXAM:    Gen:    Eyes:    Neurological:    ENMT:    Neck:    Pulmonary:    Cardiovascular:    Gastrointestinal:    Genitourinary:    Back:    Extremities:    Skin:    Musculoskeletal:          LABS:  CBC Full  -  ( 2018 05:49 )  WBC Count : 12.4 K/uL  Hemoglobin : 13.3 g/dL  Hematocrit : 41.8 %  Platelet Count - Automated : 282 K/uL  Mean Cell Volume : 79.2 fl  Mean Cell Hemoglobin : 25.2 pg  Mean Cell Hemoglobin Concentration : 31.8 g/dL  Auto Neutrophil # : 9.5 K/uL  Auto Lymphocyte # : 1.2 K/uL  Auto Monocyte # : 1.7 K/uL  Auto Eosinophil # : 0.0 K/uL  Auto Basophil # : 0.0 K/uL  Auto Neutrophil % : 76.1 %  Auto Lymphocyte % : 9.8 %  Auto Monocyte % : 13.7 %  Auto Eosinophil % : 0.1 %  Auto Basophil % : 0.1 %    18    140  |  97<L>  |  26.0<H>  ----------------------------<  194<H>  4.2   |  29.0  |  0.43<L>    Ca    9.5      2018 05:49  Phos  3.4     06-18  Mg     2.3     06-18        Urinalysis Basic - ( 2018 13:50 )    Color: Yellow / Appearance: Clear / S.010 / pH: x  Gluc: x / Ketone: Negative  / Bili: Negative / Urobili: 1 mg/dL   Blood: x / Protein: 100 mg/dL / Nitrite: Negative   Leuk Esterase: Negative / RBC: 3-5 /HPF / WBC 0-2   Sq Epi: x / Non Sq Epi: Occasional / Bacteria: x      RECENT CULTURES:            CAPILLARY BLOOD GLUCOSE      RADIOLOGY & ADDITIONAL STUDIES:    ASSESSMENT/PLAN:  72yFemale presenting with: IVH stable neuro exam, palliative care consult to address goals of care and delineate further care    Neuro:     HEENT:    CV:    Pulm:    GI/Nutrition:    /Renal:    ID:    Lines/Tubes:    Endo:    Skin:    Proph:    Dispo:      CRITICAL CARE TIME SPENT: INTERVAL HPI/OVERNIGHT EVENTS/SUBJECTIVE: emesis overnight, no gastric distention of cxr.     ICU Vital Signs Last 24 Hrs  T(C): 38.4 (2018 08:00), Max: 38.4 (2018 08:00)  T(F): 101.2 (2018 08:00), Max: 101.2 (2018 08:00)  HR: 82 (2018 10:00) (62 - 105)  BP: 152/65 (2018 10:00) (104/62 - 169/88)  BP(mean): 94 (2018 10:00) (75 - 124)  ABP: --  ABP(mean): --  RR: 21 (2018 10:00) (19 - 32)  SpO2: 99% (2018 10:00) (95% - 100%)      I&O's Detail    2018 07:01  -  2018 07:00  --------------------------------------------------------  IN:    Enteral Tube Flush: 220 mL    Pivot: 850 mL    Solution: 200 mL    Solution: 100 mL    Solution: 250 mL  Total IN: 1620 mL    OUT:    Indwelling Catheter - Urethral: 2275 mL  Total OUT: 2275 mL    Total NET: -655 mL      2018 07:01  -  2018 10:24  --------------------------------------------------------  IN:    Solution: 100 mL  Total IN: 100 mL    OUT:    Indwelling Catheter - Urethral: 200 mL  Total OUT: 200 mL    Total NET: -100 mL                MEDICATIONS  (STANDING):  atorvastatin 40 milliGRAM(s) Oral at bedtime  enoxaparin Injectable 40 milliGRAM(s) SubCutaneous daily  insulin lispro (HumaLOG) corrective regimen sliding scale   SubCutaneous every 6 hours  levETIRAcetam  IVPB 500 milliGRAM(s) IV Intermittent every 12 hours  lisinopril 10 milliGRAM(s) Oral daily  metoprolol tartrate 50 milliGRAM(s) Oral every 12 hours  nicotine -  14 mG/24Hr(s) Patch 1 patch Transdermal daily  pantoprazole  Injectable 40 milliGRAM(s) IV Push daily  petrolatum white Ointment 1 Application(s) Topical every 6 hours    MEDICATIONS  (PRN):  ALBUTerol    0.083% 2.5 milliGRAM(s) Nebulizer every 6 hours PRN Shortness of Breath and/or Wheezing          PHYSICAL EXAM:    Gen: NAD    Eyes: EMETERIO    Neurological: GCS 7-8 left side neglect, RUE unintentional movememts    ENMT: anicteric sclerae, Dobbhoff in place    Neck: no JVD    Pulmonary: CTA scattered ronchii    Cardiovascular: Rate controlled afibrilation     Gastrointestinal: soft, non tender no distended    Genitourinary: claer urine in chaney    Back: no issues    Extremities: +1 edema    Skin: intact    Musculoskeletal: no deformities          LABS:  CBC Full  -  ( 2018 05:49 )  WBC Count : 12.4 K/uL  Hemoglobin : 13.3 g/dL  Hematocrit : 41.8 %  Platelet Count - Automated : 282 K/uL  Mean Cell Volume : 79.2 fl  Mean Cell Hemoglobin : 25.2 pg  Mean Cell Hemoglobin Concentration : 31.8 g/dL  Auto Neutrophil # : 9.5 K/uL  Auto Lymphocyte # : 1.2 K/uL  Auto Monocyte # : 1.7 K/uL  Auto Eosinophil # : 0.0 K/uL  Auto Basophil # : 0.0 K/uL  Auto Neutrophil % : 76.1 %  Auto Lymphocyte % : 9.8 %  Auto Monocyte % : 13.7 %  Auto Eosinophil % : 0.1 %  Auto Basophil % : 0.1 %        140  |  97<L>  |  26.0<H>  ----------------------------<  194<H>  4.2   |  29.0  |  0.43<L>    Ca    9.5      2018 05:49  Phos  3.4     -18  Mg     2.3     -18        Urinalysis Basic - ( 2018 13:50 )    Color: Yellow / Appearance: Clear / S.010 / pH: x  Gluc: x / Ketone: Negative  / Bili: Negative / Urobili: 1 mg/dL   Blood: x / Protein: 100 mg/dL / Nitrite: Negative   Leuk Esterase: Negative / RBC: 3-5 /HPF / WBC 0-2   Sq Epi: x / Non Sq Epi: Occasional / Bacteria: x      RECENT CULTURES:            CAPILLARY BLOOD GLUCOSE      RADIOLOGY & ADDITIONAL STUDIES:    ASSESSMENT/PLAN:  72yFemale presenting with: IVH stable neuro exam, palliative care consult to address goals of care and delineate further care    Neuro: No change in neuro exam, poor meaningfully neurologically recovery, family want ot wait another 48 hrs before making decision on code status and decision about enteral access and long term plas    HEENT: no issues    CV: perfusion adequate a fib rate controlled.    Pulm: high risk for aspiration, aggressive oral care    GI/Nutrition: resume TF. will require PEG    /Renal: remove chaney, hyponatremia has resolved.    ID: no issues    Lines/Tubes: no issues    Endo: RISS    Skin: intact    Proph: Lovenox    Dispo: ICU for close monitoring of airway and neuro exam      CRITICAL CARE TIME SPENT: 35 minutes

## 2018-06-18 NOTE — PROGRESS NOTE ADULT - SUBJECTIVE AND OBJECTIVE BOX
CC: patient being seen for ICH, dysphagia, poor mental status. Patient remains essentially unresponsive. spiked fever this . not tolerating tube feeds.     Present Symptoms:     Dyspnea: 0   Nausea/Vomiting: No  Anxiety:  No  Depression: unable   Fatigue: Yes   Loss of appetite: unable     Pain: none             Character-            Duration-            Effect-            Factors-            Frequency-            Location-            Severity-    Review of Systems: Reviewed                     Unable to obtain due to poor mentation   All others negative    MEDICATIONS  (STANDING):  atorvastatin 40 milliGRAM(s) Oral at bedtime  enoxaparin Injectable 40 milliGRAM(s) SubCutaneous daily  insulin lispro (HumaLOG) corrective regimen sliding scale   SubCutaneous every 6 hours  levETIRAcetam  IVPB 500 milliGRAM(s) IV Intermittent every 12 hours  lisinopril 10 milliGRAM(s) Oral daily  metoprolol tartrate 50 milliGRAM(s) Oral every 12 hours  nicotine -  14 mG/24Hr(s) Patch 1 patch Transdermal daily  pantoprazole  Injectable 40 milliGRAM(s) IV Push daily  petrolatum white Ointment 1 Application(s) Topical every 6 hours    MEDICATIONS  (PRN):  ALBUTerol    0.083% 2.5 milliGRAM(s) Nebulizer every 6 hours PRN Shortness of Breath and/or Wheezing    PHYSICAL EXAM:    Vital Signs Last 24 Hrs  T(C): 38.4 (2018 08:00), Max: 38.4 (2018 08:00)  T(F): 101.2 (2018 08:00), Max: 101.2 (2018 08:00)  HR: 82 (2018 10:00) (62 - 105)  BP: 152/65 (2018 10:00) (104/62 - 169/88)  BP(mean): 94 (2018 10:00) (75 - 124)  RR: 21 (2018 10:00) (19 - 32)  SpO2: 99% (2018 10:00) (95% - 100%)    General: lethargic, unresponsive. not opening her eyes for me today, not following commands, not tracking.     Karnofsky:  20 %    HEENT: normal      Lungs: comfortable     CV: normal      GI: normal; NG tube     : incontinent     MSK: weakness    Skin: no rash    LABS:                      13.3   12.4  )-----------( 282      ( 2018 05:49 )             41.8     06-18    140  |  97<L>  |  26.0<H>  ----------------------------<  194<H>  4.2   |  29.0  |  0.43<L>    Ca    9.5      2018 05:49  Phos  3.4       Mg     2.3     18    Urinalysis Basic - ( 2018 13:50 )    Color: Yellow / Appearance: Clear / S.010 / pH: x  Gluc: x / Ketone: Negative  / Bili: Negative / Urobili: 1 mg/dL   Blood: x / Protein: 100 mg/dL / Nitrite: Negative   Leuk Esterase: Negative / RBC: 3-5 /HPF / WBC 0-2   Sq Epi: x / Non Sq Epi: Occasional / Bacteria: x    I&O's Summary    2018 07:  -  2018 07:00  --------------------------------------------------------  IN: 1620 mL / OUT: 2275 mL / NET: -655 mL    2018 07:  -  2018 10:47  --------------------------------------------------------  IN: 100 mL / OUT: 200 mL / NET: -100 mL    RADIOLOGY & ADDITIONAL STUDIES:    ADVANCE DIRECTIVES: Full Code

## 2018-06-18 NOTE — OCCUPATIONAL THERAPY INITIAL EVALUATION ADULT - DIAGNOSIS, OT EVAL
Stable acute multicompartment ICH Stable acute multicompartment ICH; Large corpus callosum hemorrhage

## 2018-06-18 NOTE — CONSULT NOTE ADULT - SUBJECTIVE AND OBJECTIVE BOX
72yF was admitted on  from [place]  after an unwitnessed fall, found responsive but with AMS. A head CT there showed an ICH with elevated INR. She was treansferred when GCS declined. At admission, patient had pupils unreactive and GCS=8T. Repeat head CT showed an increased IPH, IVH and new mild hydrocephalus.   Patient was extubated on  and had a witnessed seizure. She was placed on Keppra.   EEG was negative. Course complicated by new AFIB. Had NGT placed for TF. She had emesis overnight.      and daughter at bedside. Noted to have intermittent and spontaneous eye opening. Noted to have nonspecific hand movements.     REVIEW OF SYSTEMS  Unable to provide    VITALS  T(C): 37.7 (18 @ 12:00), Max: 38.4 (18 @ 08:00)  HR: 74 (18 @ 12:00) (62 - 105)  BP: 141/87 (18 @ 12:00) (104/62 - 169/88)  RR: 20 (18 @ 12:00) (19 - 32)  SpO2: 98% (18 @ 12:00) (95% - 100%)  Wt(kg): --    PAST MEDICAL & SURGICAL HISTORY  Hypertension, unspecified type  Coronary artery disease, angina presence unspecified, unspecified vessel or lesion type, unspecified whether native or transplanted heart  Myocardial infarction, unspecified MI type, unspecified artery  H/O heart artery stent    SOCIAL HISTORY  Smoking - +  EtOH - Denied   Drugs - Denied    FUNCTIONAL HISTORY  Lives with spouse, 4 ALPHONSE  Independent, PRN holding on for unsteady gait    CURRENT FUNCTIONAL STATUS  Total A    FAMILY HISTORY   NC    RECENT LABS/IMAGING  CBC Full  -  ( 2018 05:49 )  WBC Count : 12.4 K/uL  Hemoglobin : 13.3 g/dL  Hematocrit : 41.8 %  Platelet Count - Automated : 282 K/uL  Mean Cell Volume : 79.2 fl  Mean Cell Hemoglobin : 25.2 pg  Mean Cell Hemoglobin Concentration : 31.8 g/dL  Auto Neutrophil # : 9.5 K/uL  Auto Lymphocyte # : 1.2 K/uL  Auto Monocyte # : 1.7 K/uL  Auto Eosinophil # : 0.0 K/uL  Auto Basophil # : 0.0 K/uL  Auto Neutrophil % : 76.1 %  Auto Lymphocyte % : 9.8 %  Auto Monocyte % : 13.7 %  Auto Eosinophil % : 0.1 %  Auto Basophil % : 0.1 %    18    140  |  97<L>  |  26.0<H>  ----------------------------<  194<H>  4.2   |  29.0  |  0.43<L>    Ca    9.5      2018 05:49  Phos  3.4     -18  Mg     2.3     -18      Urinalysis Basic - ( 2018 13:50 )    Color: Yellow / Appearance: Clear / S.010 / pH: x  Gluc: x / Ketone: Negative  / Bili: Negative / Urobili: 1 mg/dL   Blood: x / Protein: 100 mg/dL / Nitrite: Negative   Leuk Esterase: Negative / RBC: 3-5 /HPF / WBC 0-2   Sq Epi: x / Non Sq Epi: Occasional / Bacteria: x        ALLERGIES  No Known Allergies      MEDICATIONS   ALBUTerol    0.083% 2.5 milliGRAM(s) Nebulizer every 6 hours PRN  atorvastatin 40 milliGRAM(s) Oral at bedtime  enoxaparin Injectable 40 milliGRAM(s) SubCutaneous daily  insulin lispro (HumaLOG) corrective regimen sliding scale   SubCutaneous every 6 hours  levETIRAcetam  Solution 500 milliGRAM(s) Oral two times a day  lisinopril 10 milliGRAM(s) Oral daily  metoprolol tartrate 50 milliGRAM(s) Oral every 12 hours  nicotine -  14 mG/24Hr(s) Patch 1 patch Transdermal daily  pantoprazole  Injectable 40 milliGRAM(s) IV Push daily  petrolatum white Ointment 1 Application(s) Topical every 6 hours      ----------------------------------------------------------------------------------------  PHYSICAL EXAM  Constitutional - NAD, Comfortable  HEENT - NCAT  Neck - Supple, No limited ROM  Chest - Breathing comfortably, No wheezing  Cardiovascular - S1S2   Abdomen - Soft   Extremities - No C/C/E, No calf tenderness   Neurologic Exam -                    Cognitive - Eyes closed, lethargic     Communication - Non verbal, grimaces at times     Motor - No command following, moves BUE hands, rigidity to the left side PROM    Coma Recovery Scale - Revised  AUDITORY FUNCTION SCALE  1 - Auditory Startle  VISUAL FUNCTION SCALE  0 - None  MOTOR FUNCTION SCALE  2 - Flexion Withdrawal  OROMOTOR/VERBAL FUNCTION SCALE  1 - Oral Reflexive Movement  COMMUNICATION SCALE  0 - None  AROUSAL SCALE  1 - Eye Opening with Stimulation     TOTAL SCORE = 5     ----------------------------------------------------------------------------------------  ASSESSMENT/PLAN  72yFemale with functional deficits after an IVH/ICH now with significant neurological impairements  Pain - Tylenol  Seizures - Keppra  Arousal --- need ongoing monitoring to further assess. Consider adding amantadine 100mg Q6am/12PM as well as melatonin 3mg HS.   DVT PPX - SCDs, Lovenox  Rehab - Patient with significant neurological deficits. Will need serial exams to determine neurologic state and prognosis. Given patient's age and IVH, prognosis is poorer, as it is compounded by other medial comorbidities. Continue bedside therapy as well as OOB throughout the day with mobilization throughout the day with staff to maintain cardiopulmonary function and prevention of secondary complications related to debility.

## 2018-06-19 LAB
ANION GAP SERPL CALC-SCNC: 14 MMOL/L — SIGNIFICANT CHANGE UP (ref 5–17)
BUN SERPL-MCNC: 35 MG/DL — HIGH (ref 8–20)
CALCIUM SERPL-MCNC: 9.3 MG/DL — SIGNIFICANT CHANGE UP (ref 8.6–10.2)
CHLORIDE SERPL-SCNC: 97 MMOL/L — LOW (ref 98–107)
CO2 SERPL-SCNC: 27 MMOL/L — SIGNIFICANT CHANGE UP (ref 22–29)
CREAT SERPL-MCNC: 0.49 MG/DL — LOW (ref 0.5–1.3)
GLUCOSE BLDC GLUCOMTR-MCNC: 165 MG/DL — HIGH (ref 70–99)
GLUCOSE BLDC GLUCOMTR-MCNC: 173 MG/DL — HIGH (ref 70–99)
GLUCOSE BLDC GLUCOMTR-MCNC: 189 MG/DL — HIGH (ref 70–99)
GLUCOSE BLDC GLUCOMTR-MCNC: 199 MG/DL — HIGH (ref 70–99)
GLUCOSE SERPL-MCNC: 188 MG/DL — HIGH (ref 70–115)
HCT VFR BLD CALC: 40.8 % — SIGNIFICANT CHANGE UP (ref 37–47)
HGB BLD-MCNC: 13.1 G/DL — SIGNIFICANT CHANGE UP (ref 12–16)
MAGNESIUM SERPL-MCNC: 2.2 MG/DL — SIGNIFICANT CHANGE UP (ref 1.6–2.6)
MCHC RBC-ENTMCNC: 24.8 PG — LOW (ref 27–31)
MCHC RBC-ENTMCNC: 32.1 G/DL — SIGNIFICANT CHANGE UP (ref 32–36)
MCV RBC AUTO: 77.3 FL — LOW (ref 81–99)
PHOSPHATE SERPL-MCNC: 3.4 MG/DL — SIGNIFICANT CHANGE UP (ref 2.4–4.7)
PLATELET # BLD AUTO: 266 K/UL — SIGNIFICANT CHANGE UP (ref 150–400)
POTASSIUM SERPL-MCNC: 4.1 MMOL/L — SIGNIFICANT CHANGE UP (ref 3.5–5.3)
POTASSIUM SERPL-SCNC: 4.1 MMOL/L — SIGNIFICANT CHANGE UP (ref 3.5–5.3)
RBC # BLD: 5.28 M/UL — HIGH (ref 4.4–5.2)
RBC # FLD: 18.1 % — HIGH (ref 11–15.6)
SODIUM SERPL-SCNC: 138 MMOL/L — SIGNIFICANT CHANGE UP (ref 135–145)
WBC # BLD: 15 K/UL — HIGH (ref 4.8–10.8)
WBC # FLD AUTO: 15 K/UL — HIGH (ref 4.8–10.8)

## 2018-06-19 PROCEDURE — 99233 SBSQ HOSP IP/OBS HIGH 50: CPT

## 2018-06-19 RX ORDER — LABETALOL HCL 100 MG
10 TABLET ORAL ONCE
Qty: 0 | Refills: 0 | Status: COMPLETED | OUTPATIENT
Start: 2018-06-19 | End: 2018-06-19

## 2018-06-19 RX ORDER — LANOLIN ALCOHOL/MO/W.PET/CERES
3 CREAM (GRAM) TOPICAL AT BEDTIME
Qty: 0 | Refills: 0 | Status: DISCONTINUED | OUTPATIENT
Start: 2018-06-19 | End: 2018-06-20

## 2018-06-19 RX ORDER — ACETAMINOPHEN 500 MG
650 TABLET ORAL EVERY 6 HOURS
Qty: 0 | Refills: 0 | Status: DISCONTINUED | OUTPATIENT
Start: 2018-06-19 | End: 2018-06-20

## 2018-06-19 RX ORDER — ACETAMINOPHEN 500 MG
650 TABLET ORAL EVERY 6 HOURS
Qty: 0 | Refills: 0 | Status: DISCONTINUED | OUTPATIENT
Start: 2018-06-19 | End: 2018-06-19

## 2018-06-19 RX ORDER — AMANTADINE HCL 100 MG
100 CAPSULE ORAL
Qty: 0 | Refills: 0 | Status: DISCONTINUED | OUTPATIENT
Start: 2018-06-19 | End: 2018-06-20

## 2018-06-19 RX ADMIN — Medication 10 MILLIGRAM(S): at 19:24

## 2018-06-19 RX ADMIN — Medication 50 MILLIGRAM(S): at 11:23

## 2018-06-19 RX ADMIN — Medication 100 MILLIGRAM(S): at 15:39

## 2018-06-19 RX ADMIN — Medication 10 MILLIGRAM(S): at 12:00

## 2018-06-19 RX ADMIN — Medication 1 PATCH: at 11:23

## 2018-06-19 RX ADMIN — LISINOPRIL 10 MILLIGRAM(S): 2.5 TABLET ORAL at 05:57

## 2018-06-19 RX ADMIN — Medication 1 APPLICATION(S): at 17:36

## 2018-06-19 RX ADMIN — PANTOPRAZOLE SODIUM 40 MILLIGRAM(S): 20 TABLET, DELAYED RELEASE ORAL at 11:23

## 2018-06-19 RX ADMIN — Medication 50 MILLIGRAM(S): at 23:03

## 2018-06-19 RX ADMIN — Medication 2: at 17:35

## 2018-06-19 RX ADMIN — LEVETIRACETAM 500 MILLIGRAM(S): 250 TABLET, FILM COATED ORAL at 17:35

## 2018-06-19 RX ADMIN — ATORVASTATIN CALCIUM 40 MILLIGRAM(S): 80 TABLET, FILM COATED ORAL at 23:03

## 2018-06-19 RX ADMIN — Medication 2: at 23:08

## 2018-06-19 RX ADMIN — Medication 1 APPLICATION(S): at 06:04

## 2018-06-19 RX ADMIN — Medication 2: at 11:23

## 2018-06-19 RX ADMIN — Medication 1 APPLICATION(S): at 11:24

## 2018-06-19 RX ADMIN — Medication 650 MILLIGRAM(S): at 13:00

## 2018-06-19 RX ADMIN — ENOXAPARIN SODIUM 40 MILLIGRAM(S): 100 INJECTION SUBCUTANEOUS at 11:23

## 2018-06-19 RX ADMIN — Medication 2: at 06:03

## 2018-06-19 RX ADMIN — LEVETIRACETAM 500 MILLIGRAM(S): 250 TABLET, FILM COATED ORAL at 05:58

## 2018-06-19 RX ADMIN — Medication 10 MILLIGRAM(S): at 18:23

## 2018-06-19 RX ADMIN — Medication 650 MILLIGRAM(S): at 19:49

## 2018-06-19 RX ADMIN — Medication 1 PATCH: at 11:24

## 2018-06-19 RX ADMIN — Medication 3 MILLIGRAM(S): at 23:03

## 2018-06-19 NOTE — PROGRESS NOTE ADULT - SUBJECTIVE AND OBJECTIVE BOX
INTERVAL HPI/OVERNIGHT EVENTS/SUBJECTIVE: no issues    ICU Vital Signs Last 24 Hrs  T(C): 38.3 (19 Jun 2018 12:00), Max: 38.4 (19 Jun 2018 00:00)  T(F): 101 (19 Jun 2018 12:00), Max: 101.1 (19 Jun 2018 00:00)  HR: 97 (19 Jun 2018 15:00) (73 - 103)  BP: 143/106 (19 Jun 2018 15:00) (122/79 - 195/115)  BP(mean): 121 (19 Jun 2018 15:00) (95 - 141)  ABP: --  ABP(mean): --  RR: 34 (19 Jun 2018 15:00) (18 - 74)  SpO2: 94% (19 Jun 2018 15:00) (92% - 98%)      I&O's Detail    18 Jun 2018 07:01  -  19 Jun 2018 07:00  --------------------------------------------------------  IN:    Enteral Tube Flush: 240 mL    Pivot: 900 mL    Solution: 100 mL  Total IN: 1240 mL    OUT:    Indwelling Catheter - Urethral: 450 mL  Total OUT: 450 mL    Total NET: 790 mL      19 Jun 2018 07:01  -  19 Jun 2018 15:58  --------------------------------------------------------  IN:    Pivot: 300 mL  Total IN: 300 mL    OUT:  Total OUT: 0 mL    Total NET: 300 mL                MEDICATIONS  (STANDING):  amantadine Syrup 100 milliGRAM(s) Oral <User Schedule>  atorvastatin 40 milliGRAM(s) Oral at bedtime  enoxaparin Injectable 40 milliGRAM(s) SubCutaneous daily  insulin lispro (HumaLOG) corrective regimen sliding scale   SubCutaneous every 6 hours  levETIRAcetam  Solution 500 milliGRAM(s) Oral two times a day  lisinopril 10 milliGRAM(s) Oral daily  melatonin 3 milliGRAM(s) Oral at bedtime  metoprolol tartrate 50 milliGRAM(s) Oral every 12 hours  nicotine -  14 mG/24Hr(s) Patch 1 patch Transdermal daily  pantoprazole  Injectable 40 milliGRAM(s) IV Push daily  petrolatum white Ointment 1 Application(s) Topical every 6 hours    MEDICATIONS  (PRN):  acetaminophen    Suspension 650 milliGRAM(s) Oral every 6 hours PRN For Temp greater than 38 C (100.4 F)  ALBUTerol    0.083% 2.5 milliGRAM(s) Nebulizer every 6 hours PRN Shortness of Breath and/or Wheezing        PHYSICAL EXAM:    Gen: NAD    Eyes: EMETERIO    Neurological: GCS 8-7, idem, more movements on right upper no purposefully, unable to test lower extremities     ENMT: anicteric sclerae    Neck: NO JVD    Pulmonary: decrease at bases, scattered ronchii    Cardiovascular: irreg. rate trhyth    Gastrointestinal: soft, non tender    Genitourinary: no issues    Back: intact    Extremities: + 1 edema    Skin: intact    Musculoskeletal: no deformities          LABS:  CBC Full  -  ( 19 Jun 2018 06:19 )  WBC Count : 15.0 K/uL  Hemoglobin : 13.1 g/dL  Hematocrit : 40.8 %  Platelet Count - Automated : 266 K/uL  Mean Cell Volume : 77.3 fl  Mean Cell Hemoglobin : 24.8 pg  Mean Cell Hemoglobin Concentration : 32.1 g/dL  Auto Neutrophil # : x  Auto Lymphocyte # : x  Auto Monocyte # : x  Auto Eosinophil # : x  Auto Basophil # : x  Auto Neutrophil % : x  Auto Lymphocyte % : x  Auto Monocyte % : x  Auto Eosinophil % : x  Auto Basophil % : x    06-19    138  |  97<L>  |  35.0<H>  ----------------------------<  188<H>  4.1   |  27.0  |  0.49<L>    Ca    9.3      19 Jun 2018 04:33  Phos  3.4     06-19  Mg     2.2     06-19          RECENT CULTURES:            CAPILLARY BLOOD GLUCOSE      RADIOLOGY & ADDITIONAL STUDIES:    ASSESSMENT/PLAN:  72yFemale presenting with: IVH, low chances for a neurological meaningful recovery, to have family meeting tomorrow to decide PEG    Neuro: no improvement, as above    HEENT: able to clear secretions, aggressive oral care, high risk for aspiration    CV: perfusion is adequate    Pulm: aggressive pulm toilette, high risk to need to be intubated for PEG, should family decide on it, i explained this to  unable to warrantee an easy ventilator liveration is she requires to be intubated    GI/Nutrition: TF at goal need PEG for placement    /Renal: no issues    ID: no issues    Lines/Tubes: no issues    Endo: no issues    Skin: intact, no issues    Proph: lovenox    Dispo: ICU, family meeting tomorrow to address goals of care      CRITICAL CARE TIME SPENT:

## 2018-06-19 NOTE — PROGRESS NOTE ADULT - SUBJECTIVE AND OBJECTIVE BOX
Patient continues to have eyes closed, But did have them opened earlier.    at bedside. Hopeful, but understands the severity Provided emotional support.   Family meeting planned tomorrow.     FUNCTIONAL PROGRESS  Total A    REVIEW OF SYSTEMS  Constitutional - +fever  Neurological - +loss of strength    VITALS  T(C): 38.3 (06-19-18 @ 12:00), Max: 38.4 (06-19-18 @ 00:00)  HR: 87 (06-19-18 @ 13:00) (60 - 103)  BP: 150/71 (06-19-18 @ 13:00) (122/79 - 195/115)  RR: 55 (06-19-18 @ 13:00) (18 - 74)  SpO2: 94% (06-19-18 @ 13:00) (92% - 98%)  Wt(kg): --    MEDICATIONS   acetaminophen    Suspension 650 milliGRAM(s) every 6 hours PRN  ALBUTerol    0.083% 2.5 milliGRAM(s) every 6 hours PRN  atorvastatin 40 milliGRAM(s) at bedtime  enoxaparin Injectable 40 milliGRAM(s) daily  insulin lispro (HumaLOG) corrective regimen sliding scale   every 6 hours  levETIRAcetam  Solution 500 milliGRAM(s) two times a day  lisinopril 10 milliGRAM(s) daily  metoprolol tartrate 50 milliGRAM(s) every 12 hours  nicotine -  14 mG/24Hr(s) Patch 1 patch daily  pantoprazole  Injectable 40 milliGRAM(s) daily  petrolatum white Ointment 1 Application(s) every 6 hours      RECENT LABS/IMAGING  CBC Full  -  ( 19 Jun 2018 06:19 )  WBC Count : 15.0 K/uL  Hemoglobin : 13.1 g/dL  Hematocrit : 40.8 %  Platelet Count - Automated : 266 K/uL  Mean Cell Volume : 77.3 fl  Mean Cell Hemoglobin : 24.8 pg  Mean Cell Hemoglobin Concentration : 32.1 g/dL  Auto Neutrophil # : x  Auto Lymphocyte # : x  Auto Monocyte # : x  Auto Eosinophil # : x  Auto Basophil # : x  Auto Neutrophil % : x  Auto Lymphocyte % : x  Auto Monocyte % : x  Auto Eosinophil % : x  Auto Basophil % : x    06-19    138  |  97<L>  |  35.0<H>  ----------------------------<  188<H>  4.1   |  27.0  |  0.49<L>    Ca    9.3      19 Jun 2018 04:33  Phos  3.4     06-19  Mg     2.2     06-19        Coma Recovery Scale - Revised    AUDITORY FUNCTION SCALE  1 - Auditory Startle  VISUAL FUNCTION SCALE  0 - None  MOTOR FUNCTION SCALE  2 - Flexion Withdrawal  OROMOTOR/VERBAL FUNCTION SCALE  1 - Oral Reflexive Movement  COMMUNICATION SCALE  0 - None  AROUSAL SCALE  1 - Eye Opening with Stimulation    TOTAL SCORE = 5 Vegetative State Day 6  ----------------------------------------------------------------------------------------  ASSESSMENT/PLAN  72yFemale with functional deficits after an IVH/ICH now with significant neurological impairments  Pain - Tylenol  Seizures - Keppra  Arousal --- need ongoing monitoring to further assess. Consider adding amantadine 100mg Q6am/12PM as well as melatonin 3mg HS.   DVT PPX - SCDs, Lovenox  Rehab - Please re-order/request PT and OT for COMA STIM. Patient with significant neurological deficits. Will need serial exams to determine neurologic state and prognosis. Given patient's age and IVH, compounded by other medial comorbidities, at this time, consider patient's prognosis to be poor. Will continue to follow.

## 2018-06-19 NOTE — CHART NOTE - NSCHARTNOTEFT_GEN_A_CORE
Spoke with daughter Jhoana, scheduled family meeting for tomorrow with family, myself, and SICU team for 12pm to discuss next steps. Daughter just wanted to make sure that whichever route they go - whether inpatient hospice or nursing home that we try to see if we can accommodate their location either in [place] or [place]. She is very afraid of her dad driving on his own and needs a place that would be close for him.

## 2018-06-20 DIAGNOSIS — R53.2 FUNCTIONAL QUADRIPLEGIA: ICD-10-CM

## 2018-06-20 LAB
ANION GAP SERPL CALC-SCNC: 14 MMOL/L — SIGNIFICANT CHANGE UP (ref 5–17)
BUN SERPL-MCNC: 34 MG/DL — HIGH (ref 8–20)
CALCIUM SERPL-MCNC: 9.2 MG/DL — SIGNIFICANT CHANGE UP (ref 8.6–10.2)
CHLORIDE SERPL-SCNC: 98 MMOL/L — SIGNIFICANT CHANGE UP (ref 98–107)
CO2 SERPL-SCNC: 30 MMOL/L — HIGH (ref 22–29)
CREAT SERPL-MCNC: 0.46 MG/DL — LOW (ref 0.5–1.3)
GLUCOSE BLDC GLUCOMTR-MCNC: 181 MG/DL — HIGH (ref 70–99)
GLUCOSE BLDC GLUCOMTR-MCNC: 216 MG/DL — HIGH (ref 70–99)
GLUCOSE SERPL-MCNC: 202 MG/DL — HIGH (ref 70–115)
HCT VFR BLD CALC: 41.6 % — SIGNIFICANT CHANGE UP (ref 37–47)
HGB BLD-MCNC: 13.6 G/DL — SIGNIFICANT CHANGE UP (ref 12–16)
LYMPHOCYTES # BLD AUTO: 13 % — LOW (ref 20–55)
MAGNESIUM SERPL-MCNC: 2.1 MG/DL — SIGNIFICANT CHANGE UP (ref 1.6–2.6)
MCHC RBC-ENTMCNC: 25.3 PG — LOW (ref 27–31)
MCHC RBC-ENTMCNC: 32.7 G/DL — SIGNIFICANT CHANGE UP (ref 32–36)
MCV RBC AUTO: 77.5 FL — LOW (ref 81–99)
MONOCYTES NFR BLD AUTO: 11 % — HIGH (ref 3–10)
NEUTROPHILS NFR BLD AUTO: 76 % — HIGH (ref 37–73)
PHOSPHATE SERPL-MCNC: 2.7 MG/DL — SIGNIFICANT CHANGE UP (ref 2.4–4.7)
PLAT MORPH BLD: NORMAL — SIGNIFICANT CHANGE UP
PLATELET # BLD AUTO: 256 K/UL — SIGNIFICANT CHANGE UP (ref 150–400)
POTASSIUM SERPL-MCNC: 4.4 MMOL/L — SIGNIFICANT CHANGE UP (ref 3.5–5.3)
POTASSIUM SERPL-SCNC: 4.4 MMOL/L — SIGNIFICANT CHANGE UP (ref 3.5–5.3)
RBC # BLD: 5.37 M/UL — HIGH (ref 4.4–5.2)
RBC # FLD: 18.4 % — HIGH (ref 11–15.6)
RBC BLD AUTO: NORMAL — SIGNIFICANT CHANGE UP
SODIUM SERPL-SCNC: 142 MMOL/L — SIGNIFICANT CHANGE UP (ref 135–145)
WBC # BLD: 13.9 K/UL — HIGH (ref 4.8–10.8)
WBC # FLD AUTO: 13.9 K/UL — HIGH (ref 4.8–10.8)

## 2018-06-20 PROCEDURE — 99233 SBSQ HOSP IP/OBS HIGH 50: CPT

## 2018-06-20 RX ORDER — PETROLATUM,WHITE
1 JELLY (GRAM) TOPICAL
Qty: 0 | Refills: 0 | COMMUNITY
Start: 2018-06-20

## 2018-06-20 RX ORDER — MORPHINE SULFATE 50 MG/1
4 CAPSULE, EXTENDED RELEASE ORAL
Qty: 0 | Refills: 0 | COMMUNITY
Start: 2018-06-20

## 2018-06-20 RX ORDER — LEVETIRACETAM 250 MG/1
500 TABLET, FILM COATED ORAL EVERY 12 HOURS
Qty: 0 | Refills: 0 | Status: DISCONTINUED | OUTPATIENT
Start: 2018-06-20 | End: 2018-06-20

## 2018-06-20 RX ORDER — ROBINUL 0.2 MG/ML
0.4 INJECTION INTRAMUSCULAR; INTRAVENOUS EVERY 6 HOURS
Qty: 0 | Refills: 0 | Status: DISCONTINUED | OUTPATIENT
Start: 2018-06-20 | End: 2018-06-21

## 2018-06-20 RX ORDER — INSULIN LISPRO 100/ML
VIAL (ML) SUBCUTANEOUS EVERY 6 HOURS
Qty: 0 | Refills: 0 | Status: DISCONTINUED | OUTPATIENT
Start: 2018-06-20 | End: 2018-06-20

## 2018-06-20 RX ORDER — ROBINUL 0.2 MG/ML
0.4 INJECTION INTRAMUSCULAR; INTRAVENOUS
Qty: 0 | Refills: 0 | COMMUNITY
Start: 2018-06-20

## 2018-06-20 RX ORDER — MORPHINE SULFATE 50 MG/1
2 CAPSULE, EXTENDED RELEASE ORAL EVERY 8 HOURS
Qty: 0 | Refills: 0 | Status: DISCONTINUED | OUTPATIENT
Start: 2018-06-20 | End: 2018-06-21

## 2018-06-20 RX ORDER — MORPHINE SULFATE 50 MG/1
2 CAPSULE, EXTENDED RELEASE ORAL
Qty: 0 | Refills: 0 | Status: DISCONTINUED | OUTPATIENT
Start: 2018-06-20 | End: 2018-06-21

## 2018-06-20 RX ORDER — LABETALOL HCL 100 MG
10 TABLET ORAL EVERY 4 HOURS
Qty: 0 | Refills: 0 | Status: DISCONTINUED | OUTPATIENT
Start: 2018-06-20 | End: 2018-06-20

## 2018-06-20 RX ORDER — ACETAMINOPHEN 500 MG
1000 TABLET ORAL ONCE
Qty: 0 | Refills: 0 | Status: COMPLETED | OUTPATIENT
Start: 2018-06-20 | End: 2018-06-20

## 2018-06-20 RX ADMIN — Medication 10 MILLIGRAM(S): at 10:04

## 2018-06-20 RX ADMIN — LEVETIRACETAM 500 MILLIGRAM(S): 250 TABLET, FILM COATED ORAL at 06:07

## 2018-06-20 RX ADMIN — MORPHINE SULFATE 2 MILLIGRAM(S): 50 CAPSULE, EXTENDED RELEASE ORAL at 23:00

## 2018-06-20 RX ADMIN — Medication 1 APPLICATION(S): at 13:00

## 2018-06-20 RX ADMIN — LISINOPRIL 10 MILLIGRAM(S): 2.5 TABLET ORAL at 06:07

## 2018-06-20 RX ADMIN — Medication 1 MILLIGRAM(S): at 17:35

## 2018-06-20 RX ADMIN — ENOXAPARIN SODIUM 40 MILLIGRAM(S): 100 INJECTION SUBCUTANEOUS at 12:59

## 2018-06-20 RX ADMIN — Medication 4: at 13:00

## 2018-06-20 RX ADMIN — Medication 1 APPLICATION(S): at 18:03

## 2018-06-20 RX ADMIN — Medication 650 MILLIGRAM(S): at 04:40

## 2018-06-20 RX ADMIN — MORPHINE SULFATE 2 MILLIGRAM(S): 50 CAPSULE, EXTENDED RELEASE ORAL at 14:25

## 2018-06-20 RX ADMIN — Medication 1 PATCH: at 12:59

## 2018-06-20 RX ADMIN — PANTOPRAZOLE SODIUM 40 MILLIGRAM(S): 20 TABLET, DELAYED RELEASE ORAL at 13:00

## 2018-06-20 RX ADMIN — Medication 4: at 06:07

## 2018-06-20 RX ADMIN — Medication 50 MILLIGRAM(S): at 13:00

## 2018-06-20 RX ADMIN — Medication 100 MILLIGRAM(S): at 06:07

## 2018-06-20 RX ADMIN — MORPHINE SULFATE 2 MILLIGRAM(S): 50 CAPSULE, EXTENDED RELEASE ORAL at 22:39

## 2018-06-20 RX ADMIN — Medication 1 APPLICATION(S): at 06:05

## 2018-06-20 RX ADMIN — Medication 100 MILLIGRAM(S): at 13:06

## 2018-06-20 RX ADMIN — Medication 650 MILLIGRAM(S): at 12:59

## 2018-06-20 RX ADMIN — Medication 400 MILLIGRAM(S): at 20:06

## 2018-06-20 RX ADMIN — ROBINUL 0.4 MILLIGRAM(S): 0.2 INJECTION INTRAMUSCULAR; INTRAVENOUS at 17:31

## 2018-06-20 RX ADMIN — MORPHINE SULFATE 2 MILLIGRAM(S): 50 CAPSULE, EXTENDED RELEASE ORAL at 14:04

## 2018-06-20 RX ADMIN — Medication 10 MILLIGRAM(S): at 01:37

## 2018-06-20 RX ADMIN — Medication 1 PATCH: at 12:42

## 2018-06-20 NOTE — DISCHARGE NOTE ADULT - SECONDARY DIAGNOSIS.
Atrial fibrillation, unspecified type Other dysphagia Myocardial infarction, unspecified MI type, unspecified artery Hypertension, unspecified type Debility Palliative care encounter

## 2018-06-20 NOTE — DISCHARGE NOTE ADULT - CARE PLAN
Principal Discharge DX:	Intracranial hemorrhage  Goal:	Discharge to inpatient hospice  Assessment and plan of treatment:	Patient is being di  Secondary Diagnosis:	Atrial fibrillation, unspecified type  Secondary Diagnosis:	Other dysphagia  Secondary Diagnosis:	Myocardial infarction, unspecified MI type, unspecified artery  Secondary Diagnosis:	Hypertension, unspecified type  Secondary Diagnosis:	Debility  Secondary Diagnosis:	Palliative care encounter Principal Discharge DX:	Intracranial hemorrhage  Goal:	Discharge to inpatient hospice  Assessment and plan of treatment:	Patient is being discharged to in patient hospice for end of life care  Secondary Diagnosis:	Atrial fibrillation, unspecified type  Secondary Diagnosis:	Other dysphagia  Secondary Diagnosis:	Myocardial infarction, unspecified MI type, unspecified artery  Secondary Diagnosis:	Hypertension, unspecified type  Secondary Diagnosis:	Debility  Secondary Diagnosis:	Palliative care encounter

## 2018-06-20 NOTE — DISCHARGE NOTE ADULT - HOSPITAL COURSE
This is This is a 72F that sustained a traumatic subarachnoid hemorrhage after ground level fall. Patient was on plavix and coumadin at time of fall. She was initially seen in an outside institution and transferred to [facility] for further management. She was seen by neurosurgery however there was no evacuable lesion for surgical intervention and her exam has remained consistent through out her hospitalization without any improvement, GCS is 2,2,5. She was seen by physical medicine and rehabilitation and some coma stimulation medications were initiated and there was still no improvement in patient's condition. Discussions with family, ICU team and palliative care team moved toward comfort as the main goal of care given the patient's previously stated wishes and overall poor prognosis. She is appropriate for discharge to inpatient hospice today.

## 2018-06-20 NOTE — PROGRESS NOTE ADULT - SUBJECTIVE AND OBJECTIVE BOX
Family at bedside. No eye opening with family, but did have some with RN.  Appears uncomfortable at times, grimacing in face. +wet cough.    FUNCTIONAL PROGRESS  Total A    REVIEW OF SYSTEMS  Constitutional - +fever  Neurological - +loss of strength    VITALS  T(C): 37.4 (06-20-18 @ 08:00), Max: 39 (06-19-18 @ 19:18)  HR: 93 (06-20-18 @ 10:00) (82 - 106)  BP: 157/120 (06-20-18 @ 10:00) (125/85 - 193/84)  RR: 23 (06-20-18 @ 10:00) (12 - 55)  SpO2: 95% (06-20-18 @ 10:00) (93% - 97%)  Wt(kg): --    MEDICATIONS   acetaminophen    Suspension 650 milliGRAM(s) every 6 hours PRN  ALBUTerol    0.083% 2.5 milliGRAM(s) every 6 hours PRN  amantadine Syrup 100 milliGRAM(s) <User Schedule>  atorvastatin 40 milliGRAM(s) at bedtime  enoxaparin Injectable 40 milliGRAM(s) daily  insulin lispro (HumaLOG) corrective regimen sliding scale   every 6 hours  labetalol Injectable 10 milliGRAM(s) every 4 hours PRN  levETIRAcetam  Solution 500 milliGRAM(s) two times a day  lisinopril 10 milliGRAM(s) daily  melatonin 3 milliGRAM(s) at bedtime  metoprolol tartrate 50 milliGRAM(s) every 12 hours  nicotine -  14 mG/24Hr(s) Patch 1 patch daily  pantoprazole  Injectable 40 milliGRAM(s) daily  petrolatum white Ointment 1 Application(s) every 6 hours      RECENT LABS/IMAGING  CBC Full  -  ( 20 Jun 2018 05:19 )  WBC Count : 13.9 K/uL  Hemoglobin : 13.6 g/dL  Hematocrit : 41.6 %  Platelet Count - Automated : 256 K/uL  Mean Cell Volume : 77.5 fl  Mean Cell Hemoglobin : 25.3 pg  Mean Cell Hemoglobin Concentration : 32.7 g/dL  Auto Neutrophil # : x  Auto Lymphocyte # : x  Auto Monocyte # : x  Auto Eosinophil # : x  Auto Basophil # : x  Auto Neutrophil % : 76.0 %  Auto Lymphocyte % : 13.0 %  Auto Monocyte % : 11.0 %  Auto Eosinophil % : x  Auto Basophil % : x    06-20    142  |  98  |  34.0<H>  ----------------------------<  202<H>  4.4   |  30.0<H>  |  0.46<L>    Ca    9.2      20 Jun 2018 05:19  Phos  2.7     06-20  Mg     2.1     06-20    Coma Recovery Scale - Revised  AUDITORY FUNCTION SCALE  1 - Auditory Startle  VISUAL FUNCTION SCALE  0 - None  MOTOR FUNCTION SCALE  2 - Flexion Withdrawal  OROMOTOR/VERBAL FUNCTION SCALE  1 - Oral Reflexive Movement  COMMUNICATION SCALE  0 - None  AROUSAL SCALE  0 - Unarousable    TOTAL SCORE 5 = Vegetative State Day 7  ----------------------------------------------------------------------------------------  ASSESSMENT/PLAN  72yFemale with functional deficits after an IVH/ICH now with significant neurological impairments  Pain - Tylenol  Seizures - Keppra  Arousal/Seep-Wake cycle - Amantadine 100mg Q6AM/12PM (6/19, Melatonin 3mg HS (6/19)  DVT PPX - SCDs, Lovenox  Rehab - PT and OT for COMA STIM. Patient with significant neurological deficits. ongoing evaluations demonstrate vegetative state. Given patient's age and IVH, compounded by other medial comorbidities, at this time, the longer the time she spends in this state, the worse the prognosis. Discussed with family, and appreciated information pending their family meeting with ACS/Palliative care.

## 2018-06-20 NOTE — DISCHARGE NOTE ADULT - PATIENT PORTAL LINK FT
You can access the [facility] Patient Portal, offered by [facility], by registering with the following website: [email or web address]

## 2018-06-20 NOTE — DISCHARGE NOTE ADULT - MEDICATION SUMMARY - MEDICATIONS TO TAKE
I will START or STAY ON the medications listed below when I get home from the hospital:    morphine  -- 4 milligram(s) intravenous every 4 hours, As Needed tachypnea and or pain  -- Indication: For Comfort measures    LORazepam  -- 1 milligram(s) intravenous every 2 hours, As Needed for aggitation  -- Indication: For Comfort measures    petrolatum topical ointment  -- 1 application on skin every 6 hours  -- Indication: For Comfort Measures    glycopyrrolate 0.2 mg/mL injectable solution  -- 0.4 milligram(s) injectable every 6 hours, As Needed for secretions  -- Indication: For Comfort Measures I will START or STAY ON the medications listed below when I get home from the hospital:    morphine  -- 4 milligram(s) intravenous every 4 hours, As Needed tachypnea and or pain  -- Indication: For Comfort measures    acetaminophen 650 mg rectal suppository  -- 1 suppository(ies) rectally every 6 hours, As needed, For Temp greater than 38 C (100.4 F)  -- Indication: For Pain    morphine  -- Indication: For Pain    LORazepam  -- Indication: For Comfort measures    LORazepam  -- 1 milligram(s) intravenous every 2 hours, As Needed for aggitation  -- Indication: For Comfort measures    petrolatum topical ointment  -- 1 application on skin every 6 hours  -- Indication: For Comfort Measures    glycopyrrolate 0.2 mg/mL injectable solution  -- 0.4 milligram(s) injectable every 6 hours, As Needed for secretions  -- Indication: For Comfort Measures

## 2018-06-20 NOTE — PROGRESS NOTE ADULT - SUBJECTIVE AND OBJECTIVE BOX
INTERVAL HPI/OVERNIGHT EVENTS/SUBJECTIVE: no issues    ICU Vital Signs Last 24 Hrs  T(C): 37.4 (20 Jun 2018 08:00), Max: 39 (19 Jun 2018 19:18)  T(F): 99.4 (20 Jun 2018 08:00), Max: 102.2 (19 Jun 2018 19:18)  HR: 93 (20 Jun 2018 10:00) (82 - 106)  BP: 157/120 (20 Jun 2018 10:00) (125/85 - 195/115)  BP(mean): 133 (20 Jun 2018 10:00) (97 - 141)  ABP: --  ABP(mean): --  RR: 23 (20 Jun 2018 10:00) (12 - 55)  SpO2: 95% (20 Jun 2018 10:00) (93% - 97%)      I&O's Detail    19 Jun 2018 07:01  -  20 Jun 2018 07:00  --------------------------------------------------------  IN:    Enteral Tube Flush: 240 mL    Pivot: 1200 mL  Total IN: 1440 mL    OUT:  Total OUT: 0 mL    Total NET: 1440 mL                MEDICATIONS  (STANDING):  amantadine Syrup 100 milliGRAM(s) Oral <User Schedule>  atorvastatin 40 milliGRAM(s) Oral at bedtime  enoxaparin Injectable 40 milliGRAM(s) SubCutaneous daily  insulin lispro (HumaLOG) corrective regimen sliding scale   SubCutaneous every 6 hours  levETIRAcetam  Solution 500 milliGRAM(s) Oral two times a day  lisinopril 10 milliGRAM(s) Oral daily  melatonin 3 milliGRAM(s) Oral at bedtime  metoprolol tartrate 50 milliGRAM(s) Oral every 12 hours  nicotine -  14 mG/24Hr(s) Patch 1 patch Transdermal daily  pantoprazole  Injectable 40 milliGRAM(s) IV Push daily  petrolatum white Ointment 1 Application(s) Topical every 6 hours    MEDICATIONS  (PRN):  acetaminophen    Suspension 650 milliGRAM(s) Oral every 6 hours PRN For Temp greater than 38 C (100.4 F)  ALBUTerol    0.083% 2.5 milliGRAM(s) Nebulizer every 6 hours PRN Shortness of Breath and/or Wheezing  labetalol Injectable 10 milliGRAM(s) IV Push every 4 hours PRN SBP > 185          PHYSICAL EXAM:    Gen:    Eyes:    Neurological:    ENMT:    Neck:    Pulmonary:    Cardiovascular:    Gastrointestinal:    Genitourinary:    Back:    Extremities:    Skin:    Musculoskeletal:          LABS:  CBC Full  -  ( 20 Jun 2018 05:19 )  WBC Count : 13.9 K/uL  Hemoglobin : 13.6 g/dL  Hematocrit : 41.6 %  Platelet Count - Automated : 256 K/uL  Mean Cell Volume : 77.5 fl  Mean Cell Hemoglobin : 25.3 pg  Mean Cell Hemoglobin Concentration : 32.7 g/dL  Auto Neutrophil # : x  Auto Lymphocyte # : x  Auto Monocyte # : x  Auto Eosinophil # : x  Auto Basophil # : x  Auto Neutrophil % : 76.0 %  Auto Lymphocyte % : 13.0 %  Auto Monocyte % : 11.0 %  Auto Eosinophil % : x  Auto Basophil % : x    06-20    142  |  98  |  34.0<H>  ----------------------------<  202<H>  4.4   |  30.0<H>  |  0.46<L>    Ca    9.2      20 Jun 2018 05:19  Phos  2.7     06-20  Mg     2.1     06-20          RECENT CULTURES:            CAPILLARY BLOOD GLUCOSE      RADIOLOGY & ADDITIONAL STUDIES:    ASSESSMENT/PLAN:  72yFemale presenting with: IVH with minimal to none improvement    Neuro:    HEENT:    CV:    Pulm:    GI/Nutrition:    /Renal:    ID:    Lines/Tubes:    Endo:    Skin:    Proph:    Dispo:      CRITICAL CARE TIME SPENT: INTERVAL HPI/OVERNIGHT EVENTS/SUBJECTIVE: no issues    ICU Vital Signs Last 24 Hrs  T(C): 37.4 (20 Jun 2018 08:00), Max: 39 (19 Jun 2018 19:18)  T(F): 99.4 (20 Jun 2018 08:00), Max: 102.2 (19 Jun 2018 19:18)  HR: 93 (20 Jun 2018 10:00) (82 - 106)  BP: 157/120 (20 Jun 2018 10:00) (125/85 - 195/115)  BP(mean): 133 (20 Jun 2018 10:00) (97 - 141)  ABP: --  ABP(mean): --  RR: 23 (20 Jun 2018 10:00) (12 - 55)  SpO2: 95% (20 Jun 2018 10:00) (93% - 97%)      I&O's Detail    19 Jun 2018 07:01  -  20 Jun 2018 07:00  --------------------------------------------------------  IN:    Enteral Tube Flush: 240 mL    Pivot: 1200 mL  Total IN: 1440 mL    OUT:  Total OUT: 0 mL    Total NET: 1440 mL                MEDICATIONS  (STANDING):  amantadine Syrup 100 milliGRAM(s) Oral <User Schedule>  atorvastatin 40 milliGRAM(s) Oral at bedtime  enoxaparin Injectable 40 milliGRAM(s) SubCutaneous daily  insulin lispro (HumaLOG) corrective regimen sliding scale   SubCutaneous every 6 hours  levETIRAcetam  Solution 500 milliGRAM(s) Oral two times a day  lisinopril 10 milliGRAM(s) Oral daily  melatonin 3 milliGRAM(s) Oral at bedtime  metoprolol tartrate 50 milliGRAM(s) Oral every 12 hours  nicotine -  14 mG/24Hr(s) Patch 1 patch Transdermal daily  pantoprazole  Injectable 40 milliGRAM(s) IV Push daily  petrolatum white Ointment 1 Application(s) Topical every 6 hours    MEDICATIONS  (PRN):  acetaminophen    Suspension 650 milliGRAM(s) Oral every 6 hours PRN For Temp greater than 38 C (100.4 F)  ALBUTerol    0.083% 2.5 milliGRAM(s) Nebulizer every 6 hours PRN Shortness of Breath and/or Wheezing  labetalol Injectable 10 milliGRAM(s) IV Push every 4 hours PRN SBP > 185          PHYSICAL EXAM:    Gen: NAD    Eyes: EMETERIO    Neurological: no change, no spontaneous eye opening, non purposeful RUE, remains left neglect    ENMT: anicteric sclerae    Neck: NO JVD    Pulmonary: CTA    Cardiovascular: afib    Gastrointestinal: soft no tender, non distended    Genitourinary:  voiding    Back: intact    Extremities: +2 edema    Skin: intact    Musculoskeletal: no deformities          LABS:  CBC Full  -  ( 20 Jun 2018 05:19 )  WBC Count : 13.9 K/uL  Hemoglobin : 13.6 g/dL  Hematocrit : 41.6 %  Platelet Count - Automated : 256 K/uL  Mean Cell Volume : 77.5 fl  Mean Cell Hemoglobin : 25.3 pg  Mean Cell Hemoglobin Concentration : 32.7 g/dL  Auto Neutrophil # : x  Auto Lymphocyte # : x  Auto Monocyte # : x  Auto Eosinophil # : x  Auto Basophil # : x  Auto Neutrophil % : 76.0 %  Auto Lymphocyte % : 13.0 %  Auto Monocyte % : 11.0 %  Auto Eosinophil % : x  Auto Basophil % : x    06-20    142  |  98  |  34.0<H>  ----------------------------<  202<H>  4.4   |  30.0<H>  |  0.46<L>    Ca    9.2      20 Jun 2018 05:19  Phos  2.7     06-20  Mg     2.1     06-20          RECENT CULTURES:            CAPILLARY BLOOD GLUCOSE      RADIOLOGY & ADDITIONAL STUDIES:    ASSESSMENT/PLAN:  72yFemale presenting with: IVH with minimal to none improvement    Neuro: no improvement to have family meeting today to address goals of care,. poor meaningful neurological recovery at this time    HEENT: aggressive oral care, High set up for aspiration    CV: rate controlled afib, perfusion is adequate    Pulm: aggressive pulmonary toilette , high set up for aspiration pneumonitis and pneumonia    GI/Nutrition: tolerating TF, to discuss PEG with family, high risk in requiring intubation of sedated for PEG    /Renal: voiding, function presenved    ID: no issues    Lines/Tubes: no issues    Endo: no issues    Skin: no issues    Proph: lovenox    Dispo: ICU for now      CRITICAL CARE TIME SPENT: 35 minutes

## 2018-06-20 NOTE — DISCHARGE NOTE ADULT - PLAN OF CARE
Discharge to inpatient hospice Patient is being di Patient is being discharged to in patient hospice for end of life care

## 2018-06-20 NOTE — PROGRESS NOTE ADULT - SUBJECTIVE AND OBJECTIVE BOX
CC: patient being seen for catastrophic brain hemorrhage. persistently encephalopathic, dysphagia, debility.     Present Symptoms:     Dyspnea: 0   Nausea/Vomiting: unable   Anxiety:  unable   Depression: unable   Fatigue: yes   Loss of appetite unable     Pain: none             Character-            Duration-            Effect-            Factors-            Frequency-            Location-            Severity-    Review of Systems: Reviewed             Unable to obtain due to poor mentation   All others negative    MEDICATIONS  (STANDING):  amantadine Syrup 100 milliGRAM(s) Oral <User Schedule>  atorvastatin 40 milliGRAM(s) Oral at bedtime  enoxaparin Injectable 40 milliGRAM(s) SubCutaneous daily  insulin lispro (HumaLOG) corrective regimen sliding scale   SubCutaneous every 6 hours  levETIRAcetam  Solution 500 milliGRAM(s) Oral two times a day  lisinopril 10 milliGRAM(s) Oral daily  melatonin 3 milliGRAM(s) Oral at bedtime  metoprolol tartrate 50 milliGRAM(s) Oral every 12 hours  nicotine -  14 mG/24Hr(s) Patch 1 patch Transdermal daily  pantoprazole  Injectable 40 milliGRAM(s) IV Push daily  petrolatum white Ointment 1 Application(s) Topical every 6 hours    MEDICATIONS  (PRN):  acetaminophen    Suspension 650 milliGRAM(s) Oral every 6 hours PRN For Temp greater than 38 C (100.4 F)  ALBUTerol    0.083% 2.5 milliGRAM(s) Nebulizer every 6 hours PRN Shortness of Breath and/or Wheezing  labetalol Injectable 10 milliGRAM(s) IV Push every 4 hours PRN SBP > 185    PHYSICAL EXAM:    Vital Signs Last 24 Hrs  T(C): 39.2 (20 Jun 2018 12:00), Max: 39.2 (20 Jun 2018 12:00)  T(F): 102.6 (20 Jun 2018 12:00), Max: 102.6 (20 Jun 2018 12:00)  HR: 97 (20 Jun 2018 13:00) (82 - 108)  BP: 157/69 (20 Jun 2018 13:00) (125/85 - 193/84)  BP(mean): 99 (20 Jun 2018 13:00) (91 - 138)  RR: 23 (20 Jun 2018 13:00) (12 - 34)  SpO2: 94% (20 Jun 2018 13:00) (93% - 97%)    General: unresponsive     Karnofsky:  20 %    HEENT: dry mouth     Lungs: comfortable    CV: normal      GI: normal NG tube     : incontinent     MSK: weakness      Skin: no rash    LABS:                      13.6   13.9  )-----------( 256      ( 20 Jun 2018 05:19 )             41.6     06-20    142  |  98  |  34.0<H>  ----------------------------<  202<H>  4.4   |  30.0<H>  |  0.46<L>    Ca    9.2      20 Jun 2018 05:19  Phos  2.7     06-20  Mg     2.1     06-20    I&O's Summary    19 Jun 2018 07:01  -  20 Jun 2018 07:00  --------------------------------------------------------  IN: 1440 mL / OUT: 0 mL / NET: 1440 mL    20 Jun 2018 07:01  -  20 Jun 2018 13:41  --------------------------------------------------------  IN: 300 mL / OUT: 0 mL / NET: 300 mL    RADIOLOGY & ADDITIONAL STUDIES:    ADVANCE DIRECTIVES: DNR/I after this meeting

## 2018-06-21 VITALS — RESPIRATION RATE: 63 BRPM | OXYGEN SATURATION: 99 % | HEART RATE: 125 BPM | TEMPERATURE: 103 F

## 2018-06-21 DIAGNOSIS — R45.1 RESTLESSNESS AND AGITATION: ICD-10-CM

## 2018-06-21 DIAGNOSIS — R06.02 SHORTNESS OF BREATH: ICD-10-CM

## 2018-06-21 PROCEDURE — 36415 COLL VENOUS BLD VENIPUNCTURE: CPT

## 2018-06-21 PROCEDURE — 99233 SBSQ HOSP IP/OBS HIGH 50: CPT

## 2018-06-21 PROCEDURE — 97167 OT EVAL HIGH COMPLEX 60 MIN: CPT

## 2018-06-21 PROCEDURE — 82803 BLOOD GASES ANY COMBINATION: CPT

## 2018-06-21 PROCEDURE — 94003 VENT MGMT INPAT SUBQ DAY: CPT

## 2018-06-21 PROCEDURE — P9037: CPT

## 2018-06-21 PROCEDURE — 80048 BASIC METABOLIC PNL TOTAL CA: CPT

## 2018-06-21 PROCEDURE — 96374 THER/PROPH/DIAG INJ IV PUSH: CPT | Mod: XU

## 2018-06-21 PROCEDURE — 84436 ASSAY OF TOTAL THYROXINE: CPT

## 2018-06-21 PROCEDURE — 70450 CT HEAD/BRAIN W/O DYE: CPT

## 2018-06-21 PROCEDURE — 84443 ASSAY THYROID STIM HORMONE: CPT

## 2018-06-21 PROCEDURE — 97530 THERAPEUTIC ACTIVITIES: CPT

## 2018-06-21 PROCEDURE — 36600 WITHDRAWAL OF ARTERIAL BLOOD: CPT

## 2018-06-21 PROCEDURE — 86850 RBC ANTIBODY SCREEN: CPT

## 2018-06-21 PROCEDURE — 83935 ASSAY OF URINE OSMOLALITY: CPT

## 2018-06-21 PROCEDURE — 83880 ASSAY OF NATRIURETIC PEPTIDE: CPT

## 2018-06-21 PROCEDURE — 84550 ASSAY OF BLOOD/URIC ACID: CPT

## 2018-06-21 PROCEDURE — 83735 ASSAY OF MAGNESIUM: CPT

## 2018-06-21 PROCEDURE — 81001 URINALYSIS AUTO W/SCOPE: CPT

## 2018-06-21 PROCEDURE — 94002 VENT MGMT INPAT INIT DAY: CPT

## 2018-06-21 PROCEDURE — 97163 PT EVAL HIGH COMPLEX 45 MIN: CPT

## 2018-06-21 PROCEDURE — 82947 ASSAY GLUCOSE BLOOD QUANT: CPT

## 2018-06-21 PROCEDURE — 82962 GLUCOSE BLOOD TEST: CPT

## 2018-06-21 PROCEDURE — 85576 BLOOD PLATELET AGGREGATION: CPT

## 2018-06-21 PROCEDURE — P9059: CPT

## 2018-06-21 PROCEDURE — 83930 ASSAY OF BLOOD OSMOLALITY: CPT

## 2018-06-21 PROCEDURE — 85730 THROMBOPLASTIN TIME PARTIAL: CPT

## 2018-06-21 PROCEDURE — 84100 ASSAY OF PHOSPHORUS: CPT

## 2018-06-21 PROCEDURE — 86900 BLOOD TYPING SEROLOGIC ABO: CPT

## 2018-06-21 PROCEDURE — 99285 EMERGENCY DEPT VISIT HI MDM: CPT | Mod: 25

## 2018-06-21 PROCEDURE — 70496 CT ANGIOGRAPHY HEAD: CPT

## 2018-06-21 PROCEDURE — 84132 ASSAY OF SERUM POTASSIUM: CPT

## 2018-06-21 PROCEDURE — 85014 HEMATOCRIT: CPT

## 2018-06-21 PROCEDURE — 93005 ELECTROCARDIOGRAM TRACING: CPT

## 2018-06-21 PROCEDURE — 84295 ASSAY OF SERUM SODIUM: CPT

## 2018-06-21 PROCEDURE — 80053 COMPREHEN METABOLIC PANEL: CPT

## 2018-06-21 PROCEDURE — 31720 CLEARANCE OF AIRWAYS: CPT

## 2018-06-21 PROCEDURE — 82330 ASSAY OF CALCIUM: CPT

## 2018-06-21 PROCEDURE — 36430 TRANSFUSION BLD/BLD COMPNT: CPT

## 2018-06-21 PROCEDURE — 83605 ASSAY OF LACTIC ACID: CPT

## 2018-06-21 PROCEDURE — 96375 TX/PRO/DX INJ NEW DRUG ADDON: CPT

## 2018-06-21 PROCEDURE — 85027 COMPLETE CBC AUTOMATED: CPT

## 2018-06-21 PROCEDURE — 94640 AIRWAY INHALATION TREATMENT: CPT

## 2018-06-21 PROCEDURE — 95819 EEG AWAKE AND ASLEEP: CPT

## 2018-06-21 PROCEDURE — 86901 BLOOD TYPING SEROLOGIC RH(D): CPT

## 2018-06-21 PROCEDURE — 85610 PROTHROMBIN TIME: CPT

## 2018-06-21 PROCEDURE — 84300 ASSAY OF URINE SODIUM: CPT

## 2018-06-21 PROCEDURE — 71045 X-RAY EXAM CHEST 1 VIEW: CPT

## 2018-06-21 PROCEDURE — 97110 THERAPEUTIC EXERCISES: CPT

## 2018-06-21 PROCEDURE — 82435 ASSAY OF BLOOD CHLORIDE: CPT

## 2018-06-21 PROCEDURE — 84480 ASSAY TRIIODOTHYRONINE (T3): CPT

## 2018-06-21 RX ORDER — MORPHINE SULFATE 50 MG/1
0 CAPSULE, EXTENDED RELEASE ORAL
Qty: 0 | Refills: 0 | COMMUNITY
Start: 2018-06-21

## 2018-06-21 RX ORDER — MORPHINE SULFATE 50 MG/1
2 CAPSULE, EXTENDED RELEASE ORAL EVERY 6 HOURS
Qty: 0 | Refills: 0 | Status: DISCONTINUED | OUTPATIENT
Start: 2018-06-21 | End: 2018-06-21

## 2018-06-21 RX ORDER — ACETAMINOPHEN 500 MG
650 TABLET ORAL EVERY 6 HOURS
Qty: 0 | Refills: 0 | Status: DISCONTINUED | OUTPATIENT
Start: 2018-06-21 | End: 2018-06-21

## 2018-06-21 RX ORDER — METOPROLOL TARTRATE 50 MG
5 TABLET ORAL ONCE
Qty: 0 | Refills: 0 | Status: COMPLETED | OUTPATIENT
Start: 2018-06-21 | End: 2018-06-21

## 2018-06-21 RX ORDER — ACETAMINOPHEN 500 MG
1 TABLET ORAL
Qty: 0 | Refills: 0 | COMMUNITY
Start: 2018-06-21

## 2018-06-21 RX ADMIN — MORPHINE SULFATE 2 MILLIGRAM(S): 50 CAPSULE, EXTENDED RELEASE ORAL at 12:50

## 2018-06-21 RX ADMIN — Medication 5 MILLIGRAM(S): at 10:45

## 2018-06-21 RX ADMIN — MORPHINE SULFATE 2 MILLIGRAM(S): 50 CAPSULE, EXTENDED RELEASE ORAL at 11:00

## 2018-06-21 RX ADMIN — MORPHINE SULFATE 2 MILLIGRAM(S): 50 CAPSULE, EXTENDED RELEASE ORAL at 06:00

## 2018-06-21 RX ADMIN — MORPHINE SULFATE 2 MILLIGRAM(S): 50 CAPSULE, EXTENDED RELEASE ORAL at 05:42

## 2018-06-21 RX ADMIN — Medication 1 APPLICATION(S): at 12:47

## 2018-06-21 RX ADMIN — MORPHINE SULFATE 2 MILLIGRAM(S): 50 CAPSULE, EXTENDED RELEASE ORAL at 10:47

## 2018-06-21 RX ADMIN — MORPHINE SULFATE 2 MILLIGRAM(S): 50 CAPSULE, EXTENDED RELEASE ORAL at 14:39

## 2018-06-21 RX ADMIN — Medication 1 MILLIGRAM(S): at 06:59

## 2018-06-21 RX ADMIN — Medication 1 APPLICATION(S): at 00:15

## 2018-06-21 RX ADMIN — Medication 0.5 MILLIGRAM(S): at 14:38

## 2018-06-21 RX ADMIN — Medication 1 PATCH: at 12:47

## 2018-06-21 RX ADMIN — Medication 1 MILLIGRAM(S): at 04:55

## 2018-06-21 RX ADMIN — Medication 650 MILLIGRAM(S): at 11:00

## 2018-06-21 RX ADMIN — Medication 1 APPLICATION(S): at 05:45

## 2018-06-21 NOTE — PROGRESS NOTE ADULT - PROBLEM SELECTOR PROBLEM 3
Hypertension, unspecified type
Debility
Hypertension, unspecified type
Functional quadriplegia
Agitation

## 2018-06-21 NOTE — PROGRESS NOTE ADULT - SUBJECTIVE AND OBJECTIVE BOX
family meeting yesterday.  Patient will be going to inpatient hospice when bed available  all efforts now focus on confront

## 2018-06-21 NOTE — PROGRESS NOTE ADULT - PROBLEM SELECTOR PROBLEM 1
Intracranial hemorrhage

## 2018-06-21 NOTE — PROGRESS NOTE ADULT - SUBJECTIVE AND OBJECTIVE BOX
CC : patient being seen for catastrophic     Present Symptoms:     Dyspnea: 0 1 2 3   Nausea/Vomiting: Yes No  Anxiety:  Yes No  Depression: Yes No  Fatigue: Yes No  Loss of appetite: Yes No    Pain:             Character-            Duration-            Effect-            Factors-            Frequency-            Location-            Severity-    Review of Systems: Reviewed                     Negative:                     Positive:  Unable to obtain due to poor mentation   All others negative    MEDICATIONS  (STANDING):  morphine  - Injectable 2 milliGRAM(s) IV Push every 8 hours  nicotine -  14 mG/24Hr(s) Patch 1 patch Transdermal daily  petrolatum white Ointment 1 Application(s) Topical every 6 hours    MEDICATIONS  (PRN):  ALBUTerol    0.083% 2.5 milliGRAM(s) Nebulizer every 6 hours PRN Shortness of Breath and/or Wheezing  glycopyrrolate Injectable 0.4 milliGRAM(s) IV Push every 6 hours PRN secretions  LORazepam   Injectable 0.5 milliGRAM(s) IV Push every 2 hours PRN agitation or anxiety  morphine  - Injectable 2 milliGRAM(s) IV Push every 2 hours PRN dyspnea or pain    PHYSICAL EXAM:    Vital Signs Last 24 Hrs  T(C): 38.4 (20 Jun 2018 20:00), Max: 39.2 (20 Jun 2018 12:00)  T(F): 101.1 (20 Jun 2018 20:00), Max: 102.6 (20 Jun 2018 12:00)  HR: 112 (20 Jun 2018 20:00) (90 - 112)  BP: 173/93 (20 Jun 2018 20:00) (157/69 - 176/70)  BP(mean): 126 (20 Jun 2018 20:00) (91 - 126)  RR: 21 (20 Jun 2018 20:00) (21 - 25)  SpO2: 93% (20 Jun 2018 20:00) (93% - 95%)    General: alert  oriented x ____ lethargic agitated                  cachexia  nonverbal  coma    Karnofsky:  %    HEENT: normal  dry mouth  ET tube/trach    Lungs: comfortable tachypnea/labored breathing  excessive secretions    CV: normal  tachycardia    GI: normal  distended  tender  no BS               PEG/NG/OG tube  constipation  last BM:     : normal  incontinent  oliguria/anuria  chaney    MSK: normal  weakness  edema             ambulatory  bedbound/wheelchair bound    Skin: normal  pressure ulcers- Stage_____  no rash    LABS:                      13.6   13.9  )-----------( 256      ( 20 Jun 2018 05:19 )             41.6     06-20    142  |  98  |  34.0<H>  ----------------------------<  202<H>  4.4   |  30.0<H>  |  0.46<L>    Ca    9.2      20 Jun 2018 05:19  Phos  2.7     06-20  Mg     2.1     06-20    I&O's Summary    20 Jun 2018 07:01  -  21 Jun 2018 07:00  --------------------------------------------------------  IN: 550 mL / OUT: 0 mL / NET: 550 mL    RADIOLOGY & ADDITIONAL STUDIES:    ADVANCE DIRECTIVES: DNR/I CC : patient being seen for catastrophic     Present Symptoms:     Dyspnea: 2  Nausea/Vomiting: No  Anxiety:  yes appears agitated   Depression: unable   Fatigue: Yes   Loss of appetite: unable     Pain: none             Character-            Duration-            Effect-            Factors-            Frequency-            Location-            Severity-    Review of Systems: Reviewed                  Unable to obtain due to poor mentation   All others negative    MEDICATIONS  (STANDING):  morphine  - Injectable 2 milliGRAM(s) IV Push every 8 hours  nicotine -  14 mG/24Hr(s) Patch 1 patch Transdermal daily  petrolatum white Ointment 1 Application(s) Topical every 6 hours    MEDICATIONS  (PRN):  ALBUTerol    0.083% 2.5 milliGRAM(s) Nebulizer every 6 hours PRN Shortness of Breath and/or Wheezing  glycopyrrolate Injectable 0.4 milliGRAM(s) IV Push every 6 hours PRN secretions  LORazepam   Injectable 0.5 milliGRAM(s) IV Push every 2 hours PRN agitation or anxiety  morphine  - Injectable 2 milliGRAM(s) IV Push every 2 hours PRN dyspnea or pain    PHYSICAL EXAM:    Vital Signs Last 24 Hrs  T(C): 38.4 (20 Jun 2018 20:00), Max: 39.2 (20 Jun 2018 12:00)  T(F): 101.1 (20 Jun 2018 20:00), Max: 102.6 (20 Jun 2018 12:00)  HR: 112 (20 Jun 2018 20:00) (90 - 112)  BP: 173/93 (20 Jun 2018 20:00) (157/69 - 176/70)  BP(mean): 126 (20 Jun 2018 20:00) (91 - 126)  RR: 21 (20 Jun 2018 20:00) (21 - 25)  SpO2: 93% (20 Jun 2018 20:00) (93% - 95%)    General: lethargic, moving right hand spontaneously     Karnofsky:  10 %    HEENT: normal      Lungs: tachypnea     CV: normal      GI: normal  distended  tender  no BS               PEG/NG/OG tube  constipation  last BM:     : normal  incontinent  oliguria/anuria  chaney    MSK: normal  weakness  edema             ambulatory  bedbound/wheelchair bound    Skin: normal  pressure ulcers- Stage_____  no rash    LABS:                      13.6   13.9  )-----------( 256      ( 20 Jun 2018 05:19 )             41.6     06-20    142  |  98  |  34.0<H>  ----------------------------<  202<H>  4.4   |  30.0<H>  |  0.46<L>    Ca    9.2      20 Jun 2018 05:19  Phos  2.7     06-20  Mg     2.1     06-20    I&O's Summary    20 Jun 2018 07:01  -  21 Jun 2018 07:00  --------------------------------------------------------  IN: 550 mL / OUT: 0 mL / NET: 550 mL    RADIOLOGY & ADDITIONAL STUDIES:    ADVANCE DIRECTIVES: DNR/I CC : patient being seen for catastrophic     Present Symptoms:     Dyspnea: 2  Nausea/Vomiting: No  Anxiety:  yes appears agitated   Depression: unable   Fatigue: Yes   Loss of appetite: unable     Pain: none             Character-            Duration-            Effect-            Factors-            Frequency-            Location-            Severity-    Review of Systems: Reviewed                  Unable to obtain due to poor mentation   All others negative    MEDICATIONS  (STANDING):  morphine  - Injectable 2 milliGRAM(s) IV Push every 8 hours  nicotine -  14 mG/24Hr(s) Patch 1 patch Transdermal daily  petrolatum white Ointment 1 Application(s) Topical every 6 hours    MEDICATIONS  (PRN):  ALBUTerol    0.083% 2.5 milliGRAM(s) Nebulizer every 6 hours PRN Shortness of Breath and/or Wheezing  glycopyrrolate Injectable 0.4 milliGRAM(s) IV Push every 6 hours PRN secretions  LORazepam   Injectable 0.5 milliGRAM(s) IV Push every 2 hours PRN agitation or anxiety  morphine  - Injectable 2 milliGRAM(s) IV Push every 2 hours PRN dyspnea or pain    PHYSICAL EXAM:    Vital Signs Last 24 Hrs  T(C): 38.4 (20 Jun 2018 20:00), Max: 39.2 (20 Jun 2018 12:00)  T(F): 101.1 (20 Jun 2018 20:00), Max: 102.6 (20 Jun 2018 12:00)  HR: 112 (20 Jun 2018 20:00) (90 - 112)  BP: 173/93 (20 Jun 2018 20:00) (157/69 - 176/70)  BP(mean): 126 (20 Jun 2018 20:00) (91 - 126)  RR: 21 (20 Jun 2018 20:00) (21 - 25)  SpO2: 93% (20 Jun 2018 20:00) (93% - 95%)    General: lethargic, moving right hand spontaneously     Karnofsky:  10 %    HEENT: normal      Lungs: tachypnea     CV: normal; tachycardia     GI: normal     : normal     MSK: weakness      Skin: no rash    LABS:                      13.6   13.9  )-----------( 256      ( 20 Jun 2018 05:19 )             41.6     06-20    142  |  98  |  34.0<H>  ----------------------------<  202<H>  4.4   |  30.0<H>  |  0.46<L>    Ca    9.2      20 Jun 2018 05:19  Phos  2.7     06-20  Mg     2.1     06-20    I&O's Summary    20 Jun 2018 07:01  -  21 Jun 2018 07:00  --------------------------------------------------------  IN: 550 mL / OUT: 0 mL / NET: 550 mL    RADIOLOGY & ADDITIONAL STUDIES:    ADVANCE DIRECTIVES: DNR/I

## 2018-06-21 NOTE — PROGRESS NOTE ADULT - PROBLEM SELECTOR PLAN 2
- NG tube in place, patient not tolerating NG tube feeds, and febrile at this time. I explained to family that we will have to make a decision about long term feeding in the next couple of days, either we transition NG tube to a permanent PEG or we discuss end of life options.
Plan as above.
-family has opted to pursue comfort only, remove NG tube and no PEG tube. patient not awake enough for comfort or pleasures feeds. continue mouth care.
- increased morphine to Q 6 hours as patient required some PRN dosing

## 2018-06-21 NOTE — PROGRESS NOTE ADULT - NSHPATTENDINGPLANDISCUSS_GEN_ALL_CORE
Family, ICU and Palliative care medicine, all questions answered.
Family, ICU staff, all questions answered.
Paul AVILA
ICU staff, all questions answered.
Family, Palliative and ICU team,. all questions answered.
Haley AVILA
Yevgeniy Dubose

## 2018-06-21 NOTE — PROGRESS NOTE ADULT - ATTENDING COMMENTS
COUNSELING:  Face to face meeting to discuss Advanced Care Planning - Time Spent 20 Minutes.     Thank you for the opportunity to assist with the care of this patient.   [place] [facility] Consult [facility] [phone].
NSGY Attg:    see above    patient seen and examined    Exam:  GCS 8T to my exam (reported as high as 10T per ACS)  E2 V1T M5  opens eyes to noxious  tracks  questionable commands with right  LHP    CT/A reviewed -- stable large ICH with IVH/no obviously aneurysm    I have discussed the patient's exam and imaging findings with her daughter.  I have explained that from a neurosurgical perspective, and EVD may be an option for ICP management in the setting of progressive imaging findings or a worsening exam. The patient's daughter wishes to consider all options, including potential DNR status should the patient be successfully extubated.    A/P:  - cont supportive care per ICU  - will follow
NSGY Attg:    see above    patient seen and examined    Exam:  opens eyes to noxious  maintains spontaneous eye-opening thereafter  tracks examiner; right gaze preference  no speech  ORTIZ spontaneously  purposeful bilaterally, right greater than left    A/P:  exam overall stable  - cont supportive care per ICU  - case re-discussed with family; may consider EVD if patient deteriorates clinically  - no acute neurosurgical intervention at this time based on exam above  - will follow
Thank you for the opportunity to assist with the care of this patient.   [place] [facility] Consult [facility] [phone].
Thank you for the opportunity to assist with the care of this patient.   [place] [phone].

## 2018-06-21 NOTE — PROGRESS NOTE ADULT - ASSESSMENT
72F with CAD/stents was on AC, here s/p fall, found to have large corpus callosum hemorrhage, and multi compartment ICH, with poor mental status, dysphagia, debility, now with worsening respiratory distress.

## 2018-06-21 NOTE — PROGRESS NOTE ADULT - PROBLEM SELECTOR PLAN 3
- full assist with all ADLs. explained to family that she may have some improvements generally in her mental state, but at this point the likelihood of going back home being fully neurologically and physically function is very unrealistic.
Plan as above.
-  full assist with all ADLs, patient completely unresponsive and unable to participate with therapy.
-patient required a few doses of PRN ativan for her symptoms.

## 2018-06-21 NOTE — PROGRESS NOTE ADULT - PROBLEM SELECTOR PLAN 5
- patient with escalation of symptoms, more respiratory distress, increased medications. Attempting to get her to an inpatient hospice unit, unfortunately family's preference [facility] has no beds today, still waiting to hear back from [facility]. If unable will transfer out of SICU to a private room.

## 2018-06-21 NOTE — PROGRESS NOTE ADULT - PROBLEM SELECTOR PROBLEM 2
Atrial fibrillation, unspecified type
Other dysphagia
Atrial fibrillation, unspecified type
Other dysphagia
Shortness of breath

## 2018-06-21 NOTE — PROGRESS NOTE ADULT - PROBLEM SELECTOR PLAN 1
- mental state seems to be worse this AM, patient not opening eyes, not following commands, but she is febrile so that may be contributing. If exam worsens, defer to neurosurgery regarding repeat imaging.
Plan as above.
- grave prognosis, patient has not made any improvements in over a week, she remains unresponsive and unable to follow any commands.
- catastrophic, poor prognosis

## 2018-06-21 NOTE — PROGRESS NOTE ADULT - PROBLEM SELECTOR PLAN 4
-lengthy ACP discussion with  Rodrigo, and daughter Jhoana with Dr. Davis, Paul AVILA, and Naty WATSON. Presented both options to them with regard to next steps, either PEG, possible trach, and rehab/nursing home placement vs. comfort measures and end of life care. They have clearly stated she would not want aggressive measures, and would not want to be a " burden" on others or be in a nursing home. They have opted to pursue comfort measures only, removal of NG tube, and aggressive symptom management  for agitation and dyspnea and secretions.
- lengthy discussion with  and daughter Jhoana again at bedside. Explained that we ollie have liked to see her make more improvements in terms of mental state by this point. Unfortunately, we are also faced with new issues of not tolerating the tube feeds, and fevers. I explained that in the next couple of days we will be faced with the difficult decision of deciding next steps. The two options at this point are to continue aggressive therapy, transition NG tube to PEG tube, and if she decompensates we would intubate, and then likely she will need a tracheostomy for secretion management. Option two would be to say that this is not quality of life to her, and proceed with removing the NG tube, and pursuing comfort only, and looking into transitioning to an inpatient hospice scenario.  and daughter are clear that she would not want to be this way long term but they just feel they need more time. Daughter has to go back to [place] for her daughter's graduation tomorrow, but we will arrange a family meeting for Wednesday with them, myself, and SICU team to discuss more definitively their decision. I also urged them that it is important for them to make decisions regarding advanced directives as anything can change at anytime. Ongoing discussions
- full assist with all ADLs

## 2020-09-01 NOTE — PROGRESS NOTE ADULT - PROVIDER SPECIALTY LIST ADULT
Critical Care
Neurosurgery
Palliative Care
Palliative Care
Rehab Medicine
SICU
Samples Given: TAC ointment twice daily for 3 days only.
Detail Level: Zone
Neurosurgery
Neurosurgery
Rehab Medicine
Palliative Care

## 2022-01-23 PROBLEM — I10 ESSENTIAL (PRIMARY) HYPERTENSION: Chronic | Status: ACTIVE | Noted: 2018-06-14

## 2022-01-23 PROBLEM — I21.9 ACUTE MYOCARDIAL INFARCTION, UNSPECIFIED: Chronic | Status: ACTIVE | Noted: 2018-06-14

## 2022-01-23 PROBLEM — I25.10 ATHEROSCLEROTIC HEART DISEASE OF NATIVE CORONARY ARTERY WITHOUT ANGINA PECTORIS: Chronic | Status: ACTIVE | Noted: 2018-06-14

## 2022-08-31 NOTE — ED PROVIDER NOTE - CPE EDP [facility] NORM
Impression: Other subjective visual disturbances: H53.19. Plan: Patient experiencing fluctuating blurry VA . Patient reports BSL high. Patient is following closely with PCP . Recheck in 1 month. Glasses not recommended at this time. normal...

## 2022-09-13 NOTE — AIRWAY REMOVAL NOTE  ADULT & PEDS - POSITIVE LEAK TEST
Patient presents today for annual physical.    Medication Reconciliation Complete    Adela Null LPN  9/13/2022 8:06 AM   not applicable

## 2022-10-10 NOTE — ED ADULT NURSE NOTE - CAS DISCH CONDITION
Stable Opioid Counseling: I discussed with the patient the potential side effects of opioids including but not limited to addiction, altered mental status, and depression. I stressed avoiding alcohol, benzodiazepines, muscle relaxants and sleep aids unless specifically okayed by a physician. The patient verbalized understanding of the proper use and possible adverse effects of opioids. All of the patient's questions and concerns were addressed. They were instructed to flush the remaining pills down the toilet if they did not need them for pain.

## 2023-04-16 NOTE — PHYSICAL THERAPY INITIAL EVALUATION ADULT - ACTIVE RANGE OF MOTION EXAMINATION, REHAB EVAL
time spent reviewing prior charts, meds, discussing plan with patient= 50 min pt. unable to actively participate at time of eval
